# Patient Record
Sex: MALE | Race: WHITE | NOT HISPANIC OR LATINO
[De-identification: names, ages, dates, MRNs, and addresses within clinical notes are randomized per-mention and may not be internally consistent; named-entity substitution may affect disease eponyms.]

---

## 2018-01-04 ENCOUNTER — APPOINTMENT (OUTPATIENT)
Dept: CARDIOLOGY | Facility: CLINIC | Age: 83
End: 2018-01-04

## 2018-01-09 ENCOUNTER — APPOINTMENT (OUTPATIENT)
Dept: CARDIOLOGY | Facility: CLINIC | Age: 83
End: 2018-01-09
Payer: MEDICARE

## 2018-01-09 PROCEDURE — 93306 TTE W/DOPPLER COMPLETE: CPT

## 2018-05-07 ENCOUNTER — RECORD ABSTRACTING (OUTPATIENT)
Age: 83
End: 2018-05-07

## 2018-05-07 DIAGNOSIS — E03.9 HYPOTHYROIDISM, UNSPECIFIED: ICD-10-CM

## 2018-05-07 DIAGNOSIS — K21.9 GASTRO-ESOPHAGEAL REFLUX DISEASE W/OUT ESOPHAGITIS: ICD-10-CM

## 2018-05-10 ENCOUNTER — APPOINTMENT (OUTPATIENT)
Dept: CARDIOLOGY | Facility: CLINIC | Age: 83
End: 2018-05-10
Payer: MEDICARE

## 2018-05-10 VITALS
DIASTOLIC BLOOD PRESSURE: 80 MMHG | RESPIRATION RATE: 18 BRPM | BODY MASS INDEX: 29.82 KG/M2 | HEIGHT: 73 IN | WEIGHT: 225 LBS | HEART RATE: 67 BPM | SYSTOLIC BLOOD PRESSURE: 175 MMHG

## 2018-05-10 PROCEDURE — 93000 ELECTROCARDIOGRAM COMPLETE: CPT

## 2018-05-10 PROCEDURE — 99214 OFFICE O/P EST MOD 30 MIN: CPT

## 2018-05-10 RX ORDER — VERAPAMIL HYDROCHLORIDE 240 MG/1
240 CAPSULE, DELAYED RELEASE PELLETS ORAL
Qty: 90 | Refills: 0 | Status: DISCONTINUED | COMMUNITY
Start: 2018-03-08

## 2018-05-10 RX ORDER — VERAPAMIL HYDROCHLORIDE 240 MG/1
240 TABLET ORAL DAILY
Refills: 0 | Status: DISCONTINUED | COMMUNITY
End: 2018-05-10

## 2018-05-10 RX ORDER — LEVOTHYROXINE SODIUM 0.05 MG/1
50 TABLET ORAL
Qty: 105 | Refills: 0 | Status: DISCONTINUED | COMMUNITY
Start: 2018-04-19

## 2018-05-11 RX ORDER — PRAMOXINE HYDROCHLORIDE AND HYDROCORTISONE ACETATE 10; 25 MG/G; MG/G
2.5-1 CREAM TOPICAL
Qty: 48 | Refills: 0 | Status: DISCONTINUED | COMMUNITY
Start: 2017-11-11 | End: 2018-05-11

## 2018-05-11 RX ORDER — RANITIDINE 150 MG/1
150 TABLET ORAL TWICE DAILY
Refills: 0 | Status: DISCONTINUED | COMMUNITY
End: 2018-05-11

## 2018-08-12 ENCOUNTER — MOBILE ON CALL (OUTPATIENT)
Age: 83
End: 2018-08-12

## 2018-09-17 ENCOUNTER — INPATIENT (INPATIENT)
Facility: HOSPITAL | Age: 83
LOS: 2 days | Discharge: LEFT BEFORE DISCHARGE | DRG: 291 | End: 2018-09-20
Attending: INTERNAL MEDICINE | Admitting: HOSPITALIST
Payer: MEDICARE

## 2018-09-17 VITALS
OXYGEN SATURATION: 97 % | HEIGHT: 73 IN | DIASTOLIC BLOOD PRESSURE: 84 MMHG | RESPIRATION RATE: 16 BRPM | WEIGHT: 225.09 LBS | HEART RATE: 75 BPM | TEMPERATURE: 98 F | SYSTOLIC BLOOD PRESSURE: 176 MMHG

## 2018-09-17 LAB
ALBUMIN SERPL ELPH-MCNC: 2.3 G/DL — LOW (ref 3.3–5)
ALP SERPL-CCNC: 20 U/L — LOW (ref 40–120)
ALT FLD-CCNC: 20 U/L — SIGNIFICANT CHANGE UP (ref 12–78)
ANION GAP SERPL CALC-SCNC: 5 MMOL/L — SIGNIFICANT CHANGE UP (ref 5–17)
APTT BLD: 26.3 SEC — LOW (ref 27.5–37.4)
AST SERPL-CCNC: 25 U/L — SIGNIFICANT CHANGE UP (ref 15–37)
BASOPHILS # BLD AUTO: 0.08 K/UL — SIGNIFICANT CHANGE UP (ref 0–0.2)
BASOPHILS NFR BLD AUTO: 0.6 % — SIGNIFICANT CHANGE UP (ref 0–2)
BILIRUB SERPL-MCNC: 0.3 MG/DL — SIGNIFICANT CHANGE UP (ref 0.2–1.2)
BUN SERPL-MCNC: 40 MG/DL — HIGH (ref 7–23)
CALCIUM SERPL-MCNC: 8.6 MG/DL — SIGNIFICANT CHANGE UP (ref 8.5–10.1)
CHLORIDE SERPL-SCNC: 103 MMOL/L — SIGNIFICANT CHANGE UP (ref 96–108)
CK MB BLD-MCNC: 6.7 % — HIGH (ref 0–3.5)
CK MB CFR SERPL CALC: 3 NG/ML — SIGNIFICANT CHANGE UP (ref 0–3.6)
CK SERPL-CCNC: 45 U/L — SIGNIFICANT CHANGE UP (ref 26–308)
CO2 SERPL-SCNC: 26 MMOL/L — SIGNIFICANT CHANGE UP (ref 22–31)
CREAT SERPL-MCNC: 1.9 MG/DL — HIGH (ref 0.5–1.3)
EOSINOPHIL # BLD AUTO: 0.19 K/UL — SIGNIFICANT CHANGE UP (ref 0–0.5)
EOSINOPHIL NFR BLD AUTO: 1.5 % — SIGNIFICANT CHANGE UP (ref 0–6)
GLUCOSE SERPL-MCNC: 139 MG/DL — HIGH (ref 70–99)
HCT VFR BLD CALC: 25.1 % — LOW (ref 39–50)
HGB BLD-MCNC: 7.9 G/DL — LOW (ref 13–17)
IMM GRANULOCYTES NFR BLD AUTO: 0.8 % — SIGNIFICANT CHANGE UP (ref 0–1.5)
INR BLD: 1.25 RATIO — HIGH (ref 0.88–1.16)
LYMPHOCYTES # BLD AUTO: 1.04 K/UL — SIGNIFICANT CHANGE UP (ref 1–3.3)
LYMPHOCYTES # BLD AUTO: 8.3 % — LOW (ref 13–44)
MCHC RBC-ENTMCNC: 28.2 PG — SIGNIFICANT CHANGE UP (ref 27–34)
MCHC RBC-ENTMCNC: 31.5 GM/DL — LOW (ref 32–36)
MCV RBC AUTO: 89.6 FL — SIGNIFICANT CHANGE UP (ref 80–100)
MONOCYTES # BLD AUTO: 0.72 K/UL — SIGNIFICANT CHANGE UP (ref 0–0.9)
MONOCYTES NFR BLD AUTO: 5.8 % — SIGNIFICANT CHANGE UP (ref 2–14)
NEUTROPHILS # BLD AUTO: 10.37 K/UL — HIGH (ref 1.8–7.4)
NEUTROPHILS NFR BLD AUTO: 83 % — HIGH (ref 43–77)
NRBC # BLD: 0 /100 WBCS — SIGNIFICANT CHANGE UP (ref 0–0)
NT-PROBNP SERPL-SCNC: HIGH PG/ML (ref 0–450)
PLATELET # BLD AUTO: 359 K/UL — SIGNIFICANT CHANGE UP (ref 150–400)
POTASSIUM SERPL-MCNC: 4.9 MMOL/L — SIGNIFICANT CHANGE UP (ref 3.5–5.3)
POTASSIUM SERPL-SCNC: 4.9 MMOL/L — SIGNIFICANT CHANGE UP (ref 3.5–5.3)
PROT SERPL-MCNC: 7.3 G/DL — SIGNIFICANT CHANGE UP (ref 6–8.3)
PROTHROM AB SERPL-ACNC: 13.7 SEC — HIGH (ref 9.8–12.7)
RBC # BLD: 2.8 M/UL — LOW (ref 4.2–5.8)
RBC # FLD: 16.6 % — HIGH (ref 10.3–14.5)
SODIUM SERPL-SCNC: 134 MMOL/L — LOW (ref 135–145)
TROPONIN I SERPL-MCNC: 0.05 NG/ML — HIGH (ref 0.01–0.04)
WBC # BLD: 12.5 K/UL — HIGH (ref 3.8–10.5)
WBC # FLD AUTO: 12.5 K/UL — HIGH (ref 3.8–10.5)

## 2018-09-17 PROCEDURE — 71045 X-RAY EXAM CHEST 1 VIEW: CPT | Mod: 26

## 2018-09-17 PROCEDURE — 99285 EMERGENCY DEPT VISIT HI MDM: CPT

## 2018-09-17 PROCEDURE — 93010 ELECTROCARDIOGRAM REPORT: CPT

## 2018-09-17 RX ORDER — SODIUM CHLORIDE 9 MG/ML
3 INJECTION INTRAMUSCULAR; INTRAVENOUS; SUBCUTANEOUS ONCE
Qty: 0 | Refills: 0 | Status: COMPLETED | OUTPATIENT
Start: 2018-09-17 | End: 2018-09-17

## 2018-09-17 RX ORDER — IPRATROPIUM/ALBUTEROL SULFATE 18-103MCG
3 AEROSOL WITH ADAPTER (GRAM) INHALATION ONCE
Qty: 0 | Refills: 0 | Status: COMPLETED | OUTPATIENT
Start: 2018-09-17 | End: 2018-09-17

## 2018-09-17 RX ORDER — FUROSEMIDE 40 MG
40 TABLET ORAL ONCE
Qty: 0 | Refills: 0 | Status: COMPLETED | OUTPATIENT
Start: 2018-09-17 | End: 2018-09-17

## 2018-09-17 RX ADMIN — SODIUM CHLORIDE 3 MILLILITER(S): 9 INJECTION INTRAMUSCULAR; INTRAVENOUS; SUBCUTANEOUS at 22:43

## 2018-09-17 RX ADMIN — Medication 40 MILLIGRAM(S): at 23:53

## 2018-09-17 RX ADMIN — Medication 3 MILLILITER(S): at 22:49

## 2018-09-17 RX ADMIN — Medication 125 MILLIGRAM(S): at 22:49

## 2018-09-17 RX ADMIN — Medication 3 MILLILITER(S): at 23:07

## 2018-09-17 NOTE — ED ADULT NURSE NOTE - OBJECTIVE STATEMENT
Pt comes from EMS from rehab facility. Pt reports shortness of breath for the past week, worsening today. Pt has audible wheezing present. Pt placed on oxygen, given 40 of lasix IM and and 2 nitros at facility per EMS. Pt able to speak in full sentences. Pt at rehab facility for wound to left leg.

## 2018-09-17 NOTE — ED ADULT TRIAGE NOTE - CHIEF COMPLAINT QUOTE
Pt brought from [facility] for sob, LE edema, CHF exacerbation, is currently on 5L O2 via NC to bring O2 to 97%, pt O2 sat ra was 92%

## 2018-09-17 NOTE — ED ADULT NURSE NOTE - CHIEF COMPLAINT QUOTE
Pt brought from [facility] for sob, LE edema, CHF exacerbation, is currently on 5L O2 via NC to bring O2 to 97%, pt O2 sat ra was 92%

## 2018-09-17 NOTE — ED ADULT NURSE NOTE - NSIMPLEMENTINTERV_GEN_ALL_ED
Implemented All Fall with Harm Risk Interventions:  [place] to call system. Call bell, personal items and telephone within reach. Instruct patient to call for assistance. Room bathroom lighting operational. Non-slip footwear when patient is off stretcher. Physically safe environment: no spills, clutter or unnecessary equipment. Stretcher in lowest position, wheels locked, appropriate side rails in place. Provide visual cue, wrist band, yellow gown, etc. Monitor gait and stability. Monitor for mental status changes and reorient to person, place, and time. Review medications for side effects contributing to fall risk. Reinforce activity limits and safety measures with patient and family. Provide visual clues: red socks.

## 2018-09-17 NOTE — ED PROVIDER NOTE - OBJECTIVE STATEMENT
pt bib ems from [facility] for sob/byrne, le edema. pt reports sob worsening past week, relates le edema is unchanged form baseline. pt's family reports had neg LE venous doppler today. no fevers, chills, cough, d/n/v, cp, abd pain. per report, pt given lasix 40mg and albuterol 1 neb pta.  pmd - james

## 2018-09-18 DIAGNOSIS — D64.9 ANEMIA, UNSPECIFIED: ICD-10-CM

## 2018-09-18 DIAGNOSIS — Z29.9 ENCOUNTER FOR PROPHYLACTIC MEASURES, UNSPECIFIED: ICD-10-CM

## 2018-09-18 DIAGNOSIS — I10 ESSENTIAL (PRIMARY) HYPERTENSION: ICD-10-CM

## 2018-09-18 DIAGNOSIS — N40.0 BENIGN PROSTATIC HYPERPLASIA WITHOUT LOWER URINARY TRACT SYMPTOMS: ICD-10-CM

## 2018-09-18 DIAGNOSIS — R54 AGE-RELATED PHYSICAL DEBILITY: ICD-10-CM

## 2018-09-18 DIAGNOSIS — J98.4 OTHER DISORDERS OF LUNG: ICD-10-CM

## 2018-09-18 DIAGNOSIS — M10.9 GOUT, UNSPECIFIED: ICD-10-CM

## 2018-09-18 DIAGNOSIS — I50.9 HEART FAILURE, UNSPECIFIED: ICD-10-CM

## 2018-09-18 DIAGNOSIS — R06.02 SHORTNESS OF BREATH: ICD-10-CM

## 2018-09-18 DIAGNOSIS — I51.9 HEART DISEASE, UNSPECIFIED: ICD-10-CM

## 2018-09-18 DIAGNOSIS — G62.9 POLYNEUROPATHY, UNSPECIFIED: ICD-10-CM

## 2018-09-18 DIAGNOSIS — N18.4 CHRONIC KIDNEY DISEASE, STAGE 4 (SEVERE): ICD-10-CM

## 2018-09-18 DIAGNOSIS — E03.9 HYPOTHYROIDISM, UNSPECIFIED: ICD-10-CM

## 2018-09-18 DIAGNOSIS — Z98.890 OTHER SPECIFIED POSTPROCEDURAL STATES: Chronic | ICD-10-CM

## 2018-09-18 DIAGNOSIS — J61 PNEUMOCONIOSIS DUE TO ASBESTOS AND OTHER MINERAL FIBERS: ICD-10-CM

## 2018-09-18 DIAGNOSIS — R06.00 DYSPNEA, UNSPECIFIED: ICD-10-CM

## 2018-09-18 DIAGNOSIS — N18.9 CHRONIC KIDNEY DISEASE, UNSPECIFIED: ICD-10-CM

## 2018-09-18 DIAGNOSIS — D72.829 ELEVATED WHITE BLOOD CELL COUNT, UNSPECIFIED: ICD-10-CM

## 2018-09-18 LAB
CK MB BLD-MCNC: 6.1 % — HIGH (ref 0–3.5)
CK MB BLD-MCNC: 7.4 % — HIGH (ref 0–3.5)
CK MB CFR SERPL CALC: 2.5 NG/ML — SIGNIFICANT CHANGE UP (ref 0–3.6)
CK MB CFR SERPL CALC: 2.7 NG/ML — SIGNIFICANT CHANGE UP (ref 0–3.6)
CK SERPL-CCNC: 34 U/L — SIGNIFICANT CHANGE UP (ref 26–308)
CK SERPL-CCNC: 44 U/L — SIGNIFICANT CHANGE UP (ref 26–308)
CRP SERPL-MCNC: 7.37 MG/DL — HIGH (ref 0–0.4)
HCT VFR BLD CALC: 29.4 % — LOW (ref 39–50)
HGB BLD-MCNC: 9.3 G/DL — LOW (ref 13–17)
MCHC RBC-ENTMCNC: 28.3 PG — SIGNIFICANT CHANGE UP (ref 27–34)
MCHC RBC-ENTMCNC: 31.6 GM/DL — LOW (ref 32–36)
MCV RBC AUTO: 89.4 FL — SIGNIFICANT CHANGE UP (ref 80–100)
NRBC # BLD: 0 /100 WBCS — SIGNIFICANT CHANGE UP (ref 0–0)
PLATELET # BLD AUTO: 393 K/UL — SIGNIFICANT CHANGE UP (ref 150–400)
RBC # BLD: 3.29 M/UL — LOW (ref 4.2–5.8)
RBC # FLD: 16.6 % — HIGH (ref 10.3–14.5)
TROPONIN I SERPL-MCNC: 0.04 NG/ML — SIGNIFICANT CHANGE UP (ref 0.01–0.04)
TROPONIN I SERPL-MCNC: 0.05 NG/ML — HIGH (ref 0.01–0.04)
WBC # BLD: 10.6 K/UL — HIGH (ref 3.8–10.5)
WBC # FLD AUTO: 10.6 K/UL — HIGH (ref 3.8–10.5)

## 2018-09-18 PROCEDURE — 93306 TTE W/DOPPLER COMPLETE: CPT | Mod: 26

## 2018-09-18 PROCEDURE — 12345: CPT | Mod: NC,GC

## 2018-09-18 PROCEDURE — 71250 CT THORAX DX C-: CPT | Mod: 26

## 2018-09-18 PROCEDURE — 99223 1ST HOSP IP/OBS HIGH 75: CPT | Mod: AI,GC

## 2018-09-18 PROCEDURE — 99497 ADVNCD CARE PLAN 30 MIN: CPT | Mod: 25

## 2018-09-18 PROCEDURE — 93970 EXTREMITY STUDY: CPT | Mod: 26

## 2018-09-18 PROCEDURE — 99223 1ST HOSP IP/OBS HIGH 75: CPT

## 2018-09-18 RX ORDER — FINASTERIDE 5 MG/1
5 TABLET, FILM COATED ORAL DAILY
Qty: 0 | Refills: 0 | Status: DISCONTINUED | OUTPATIENT
Start: 2018-09-18 | End: 2018-09-20

## 2018-09-18 RX ORDER — PANTOPRAZOLE SODIUM 20 MG/1
40 TABLET, DELAYED RELEASE ORAL
Qty: 0 | Refills: 0 | Status: DISCONTINUED | OUTPATIENT
Start: 2018-09-18 | End: 2018-09-20

## 2018-09-18 RX ORDER — ALBUTEROL 90 UG/1
2.5 AEROSOL, METERED ORAL EVERY 8 HOURS
Qty: 0 | Refills: 0 | Status: DISCONTINUED | OUTPATIENT
Start: 2018-09-18 | End: 2018-09-20

## 2018-09-18 RX ORDER — MAGNESIUM HYDROXIDE 400 MG/1
15 TABLET, CHEWABLE ORAL AT BEDTIME
Qty: 0 | Refills: 0 | Status: DISCONTINUED | OUTPATIENT
Start: 2018-09-18 | End: 2018-09-20

## 2018-09-18 RX ORDER — FUROSEMIDE 40 MG
40 TABLET ORAL DAILY
Qty: 0 | Refills: 0 | Status: DISCONTINUED | OUTPATIENT
Start: 2018-09-18 | End: 2018-09-18

## 2018-09-18 RX ORDER — ASCORBIC ACID 60 MG
500 TABLET,CHEWABLE ORAL DAILY
Qty: 0 | Refills: 0 | Status: DISCONTINUED | OUTPATIENT
Start: 2018-09-18 | End: 2018-09-20

## 2018-09-18 RX ORDER — CHOLECALCIFEROL (VITAMIN D3) 125 MCG
1000 CAPSULE ORAL DAILY
Qty: 0 | Refills: 0 | Status: DISCONTINUED | OUTPATIENT
Start: 2018-09-18 | End: 2018-09-20

## 2018-09-18 RX ORDER — CALCIUM CARBONATE 500(1250)
2 TABLET ORAL DAILY
Qty: 0 | Refills: 0 | Status: DISCONTINUED | OUTPATIENT
Start: 2018-09-18 | End: 2018-09-20

## 2018-09-18 RX ORDER — CALCIUM CARBONATE 500(1250)
1 TABLET ORAL DAILY
Qty: 0 | Refills: 0 | Status: DISCONTINUED | OUTPATIENT
Start: 2018-09-18 | End: 2018-09-18

## 2018-09-18 RX ORDER — CARVEDILOL PHOSPHATE 80 MG/1
25 CAPSULE, EXTENDED RELEASE ORAL EVERY 12 HOURS
Qty: 0 | Refills: 0 | Status: DISCONTINUED | OUTPATIENT
Start: 2018-09-18 | End: 2018-09-20

## 2018-09-18 RX ORDER — LEVOTHYROXINE SODIUM 125 MCG
50 TABLET ORAL DAILY
Qty: 0 | Refills: 0 | Status: DISCONTINUED | OUTPATIENT
Start: 2018-09-18 | End: 2018-09-20

## 2018-09-18 RX ORDER — ENOXAPARIN SODIUM 100 MG/ML
40 INJECTION SUBCUTANEOUS EVERY 24 HOURS
Qty: 0 | Refills: 0 | Status: DISCONTINUED | OUTPATIENT
Start: 2018-09-18 | End: 2018-09-20

## 2018-09-18 RX ORDER — TAMSULOSIN HYDROCHLORIDE 0.4 MG/1
0.4 CAPSULE ORAL AT BEDTIME
Qty: 0 | Refills: 0 | Status: DISCONTINUED | OUTPATIENT
Start: 2018-09-18 | End: 2018-09-20

## 2018-09-18 RX ORDER — GABAPENTIN 400 MG/1
100 CAPSULE ORAL AT BEDTIME
Qty: 0 | Refills: 0 | Status: DISCONTINUED | OUTPATIENT
Start: 2018-09-18 | End: 2018-09-20

## 2018-09-18 RX ORDER — FERROUS SULFATE 325(65) MG
325 TABLET ORAL DAILY
Qty: 0 | Refills: 0 | Status: DISCONTINUED | OUTPATIENT
Start: 2018-09-18 | End: 2018-09-20

## 2018-09-18 RX ORDER — DOCUSATE SODIUM 100 MG
100 CAPSULE ORAL DAILY
Qty: 0 | Refills: 0 | Status: DISCONTINUED | OUTPATIENT
Start: 2018-09-18 | End: 2018-09-20

## 2018-09-18 RX ORDER — INFLUENZA VIRUS VACCINE 15; 15; 15; 15 UG/.5ML; UG/.5ML; UG/.5ML; UG/.5ML
0.5 SUSPENSION INTRAMUSCULAR ONCE
Qty: 0 | Refills: 0 | Status: DISCONTINUED | OUTPATIENT
Start: 2018-09-18 | End: 2018-09-20

## 2018-09-18 RX ORDER — FUROSEMIDE 40 MG
40 TABLET ORAL
Qty: 0 | Refills: 0 | Status: DISCONTINUED | OUTPATIENT
Start: 2018-09-18 | End: 2018-09-20

## 2018-09-18 RX ADMIN — Medication 1000 UNIT(S): at 11:33

## 2018-09-18 RX ADMIN — Medication 50 MICROGRAM(S): at 04:55

## 2018-09-18 RX ADMIN — GABAPENTIN 100 MILLIGRAM(S): 400 CAPSULE ORAL at 21:41

## 2018-09-18 RX ADMIN — Medication 325 MILLIGRAM(S): at 11:33

## 2018-09-18 RX ADMIN — PANTOPRAZOLE SODIUM 40 MILLIGRAM(S): 20 TABLET, DELAYED RELEASE ORAL at 04:55

## 2018-09-18 RX ADMIN — ENOXAPARIN SODIUM 40 MILLIGRAM(S): 100 INJECTION SUBCUTANEOUS at 04:55

## 2018-09-18 RX ADMIN — CARVEDILOL PHOSPHATE 25 MILLIGRAM(S): 80 CAPSULE, EXTENDED RELEASE ORAL at 17:57

## 2018-09-18 RX ADMIN — TAMSULOSIN HYDROCHLORIDE 0.4 MILLIGRAM(S): 0.4 CAPSULE ORAL at 21:41

## 2018-09-18 RX ADMIN — FINASTERIDE 5 MILLIGRAM(S): 5 TABLET, FILM COATED ORAL at 11:32

## 2018-09-18 RX ADMIN — Medication 1 TABLET(S): at 11:32

## 2018-09-18 RX ADMIN — CARVEDILOL PHOSPHATE 25 MILLIGRAM(S): 80 CAPSULE, EXTENDED RELEASE ORAL at 04:56

## 2018-09-18 RX ADMIN — ALBUTEROL 2.5 MILLIGRAM(S): 90 AEROSOL, METERED ORAL at 16:13

## 2018-09-18 RX ADMIN — Medication 40 MILLIGRAM(S): at 17:57

## 2018-09-18 RX ADMIN — Medication 2 TABLET(S): at 13:03

## 2018-09-18 RX ADMIN — ALBUTEROL 2.5 MILLIGRAM(S): 90 AEROSOL, METERED ORAL at 23:13

## 2018-09-18 RX ADMIN — Medication 500 MILLIGRAM(S): at 11:35

## 2018-09-18 NOTE — H&P ADULT - ATTENDING COMMENTS
BNP elevated with respiratory distress, likely in the setting of new CHF  Continue IV lasix  Cardio consulted.   Trend trops  Will be on telemetry  F/U CT scan to rule out mass BNP elevated with respiratory distress, likely in the setting of new CHF  in setting of RICKY, previous renal function unknown, hold off on daily IV lasix, repeat labs AM.  if improved, then continue IV lasix. Strict I and Os.   Cardio consulted.   Trend trops  Will be on telemetry  F/U CT scan to rule out mass BNP elevated with respiratory distress, likely in the setting of new CHF  in setting of RICKY, previous renal function unknown, hold off on daily IV lasix, repeat labs AM.  if improved, then continue IV lasix. Strict I and Os.   Cardio consulted.   Trend trops  Will be on telemetry  repeat H and H to determine if stable and chronic.    F/U CT scan to rule out mass

## 2018-09-18 NOTE — DISCHARGE NOTE ADULT - ADDITIONAL INSTRUCTIONS
-Please follow up with your primary doctor within one week.  -Please follow up with outpatient (information below) within one week of discharge.  -Patient and family to set up follow up appointments.  -Continue taking your medications as directed above.  -If symptoms persist/worsen, please call your PMD or return to the ED.

## 2018-09-18 NOTE — H&P ADULT - PROBLEM SELECTOR PLAN 9
continue Lovenox 40mg subq daily   IMPROVE VTE Individual Risk Assessment          RISK                                                          Points  [  ] Previous VTE                                                3  [  ] Thrombophilia                                             2  [  ] Lower limb paralysis                                   2        (unable to hold up >15 seconds)    [  ] Current Cancer                                             2         (within 6 months)  [ x ] Immobilization > 24 hrs                              1  [  ] ICU/CCU stay > 24 hours                             1  [ x ] Age > 60                                                         1    IMPROVE VTE Score: 2 pt has hx of anemia, likely chronic.  Will repeat labs AM to determine if its stable and chronic

## 2018-09-18 NOTE — CONSULT NOTE ADULT - PROBLEM SELECTOR RECOMMENDATION 9
heart disease  HF  valv heart disease  AS and AI  reviewed [facility] records  cont diuresis  cardio eval in progress  daily weight  dietary discretion  prognosis guarded

## 2018-09-18 NOTE — H&P ADULT - HISTORY OF PRESENT ILLNESS
89 yo M with PMHx of HTN, Neuropathy, Hypothyroid, anemia, Gout, s/p calcaneal implanted and recent removal of infected hardware at [place] presented to ED BIB EMS from [facility] for difficulty breathing, dyspnea on exertion and left LE swellings. As per daughter at bedside, pt has been c/o cough with associated chills for few day and she was at  this afternoon visit him and pt appeared fatigued and not himself today. She states she left  around 5pm and when she called back 8pm pt c/o difficulty breathing and was unable to talk to her over the phone, when she arrived at  pt was in severe respiratory distress. Pt was given Lasix and albuterol w/o relieve thus she brought him to ED for further workup. Pt was hospitalized at [place] for infected left calcaneal hardware that was removed, pt was sent home with PICC line and wound vac then sent to  for rehab. Pt has been doing well since and wound has completely healed. She endorses to increasing Left LE swelling and edema for which venous doppler LE done at  neg for DVT. Pt states his sob has improved after Lasix in the ED, denies cp, palpitations, dizziness, recent change in his die, fever, chills. Pt is baseline anemic but recent CBC showed heg 7.3 and FOBT was done which was negative, pt received Procrit x 1 dose at . No document know hx of CHF as per daughter, Pt follow up with Dr. Anton cardiologist as outpatient.     In the ED, vitals wnl, labs pertinent for wbc 12.5, H/H 7.9/25.1, BUN/Cr 40/1.9, Na 134, troponin .047, ProBNP 05619, pt received Lasix 40mg IV x 1 dose. 89 yo M with PMHx of HTN, Neuropathy, Hypothyroid, anemia, Gout, s/p calcaneal implanted and recent removal of infected hardware at [place] presented to ED BIB EMS from [facility] for difficulty breathing, dyspnea on exertion and left LE swellings. As per daughter at bedside, pt has been c/o cough with associated chills for few day and she was at  this afternoon visit him and pt appeared fatigued and not himself today. She states she left  around 5pm and when she called back 8pm pt c/o difficulty breathing and was unable to talk to her over the phone, when she arrived at  pt was in severe respiratory distress. Pt was given Lasix and albuterol w/o relieve thus she brought him to ED for further workup. Pt was hospitalized at [place] for infected left calcaneal hardware that was removed, pt was sent home with PICC line and wound vac then sent to  for rehab. Pt has been doing well since and wound has completely healed. She endorses to increasing Left LE swelling and edema for which venous doppler LE done at  neg for DVT. Pt states his sob has improved after Lasix in the ED, denies cp, palpitations, dizziness, recent change in his diet, fever, chills. Pt is baseline anemic but recent CBC showed heg 7.3 and FOBT was done which was negative, pt received Procrit x 1 dose at . No document know hx of CHF as per daughter, Pt follow up with Dr. Anton cardiologist as outpatient.     In the ED, vitals wnl, labs pertinent for wbc 12.5, H/H 7.9/25.1, BUN/Cr 40/1.9, Na 134, troponin .047, ProBNP 33940, pt received Lasix 40mg IV x 1 dose.     No family history of CHF

## 2018-09-18 NOTE — GOALS OF CARE CONVERSATION - PERSONAL ADVANCE DIRECTIVE - CONVERSATION DETAILS
met pt w daughterDolores, unsure where hcp located, hcp form completed by pt, daughter Dolores is hcp, alt hcp son, Magen. pt has had discussions of his resuscitation wishes. pt wants full cpr at this time, pt does not want to live on tubes, would not want a feeding tube. contact # given.

## 2018-09-18 NOTE — PROGRESS NOTE ADULT - ASSESSMENT
89 yo M with PMHx of HTN, Neuropathy, Hypothyroid, anemia, Gout, s/p calcaneal implanted and recent removal of infected hardware at [place] presented to ED BIB EMS from [facility] for difficulty breathing, dyspnea on exertion and left LE swellings admitted to telemetry for acute CHF exacerbation.

## 2018-09-18 NOTE — DISCHARGE NOTE ADULT - CARE PLAN
Principal Discharge DX:	CHF (congestive heart failure)  Goal:	Stable  Assessment and plan of treatment:	You were admitted for difficulty breathing, on admission your ProBNP was elevated which is a marker that tells us about the pumping ability of your heart. An echo was done and it showed _________________.  This could have been causing your difficulty breathing as blood backs up when you have an acute exacerbation of congestive heart failure since the heart is not able to pump out blood properly.  You were treated with lasix and coreg.  Please follow up with your cardiologist and PMD within one week after discharge.  Secondary Diagnosis:	Chronic lung disease  Assessment and plan of treatment:	Because of your difficulty breathing, a chest xray was done which showed some calcifications present on the surround area of your lungs.  A CT chest was performed which showed these calcifications may be due to asbestos.  There is no treatment for this, and is mostly supportive.  Please continue to take your medications and follow up with your PMD.  Secondary Diagnosis:	CKD (chronic kidney disease)  Assessment and plan of treatment:	Your kidney function was low at the time of your admission and you were treated with lasix to help remove extra fluid from your body.  This could have been due to the fact that your heart wasn't pumping blood properly and thus preventing your kidneys from receiving the proper amount of blood.  Please continue to take lasix to control the fluid in your body and prevent too much fluid from building up. Please see your PMD within one week after discharge. Principal Discharge DX:	CHF (congestive heart failure)  Goal:	Stable  Assessment and plan of treatment:	You were admitted for difficulty breathing, on admission your ProBNP was elevated which is a marker that tells us about the pumping ability of your heart. An echo was done and it showed that the filling ability of the heart was decreased.  This could have been causing your difficulty breathing as blood backs up when you have an acute exacerbation of congestive heart failure since the heart is not able to pump out blood properly.  You were treated with lasix and coreg.  Please follow up with your cardiologist and PMD within one week after discharge.  Secondary Diagnosis:	Chronic lung disease  Assessment and plan of treatment:	Because of your difficulty breathing, a chest xray was done which showed some calcifications present on the surround area of your lungs.  A CT chest was performed which showed these calcifications may be due to asbestos.  There is no treatment for this, and is mostly supportive.  Please continue to take your medications and follow up with your PMD.  Secondary Diagnosis:	CKD (chronic kidney disease)  Assessment and plan of treatment:	Your kidney function was low at the time of your admission and you were treated with lasix to help remove extra fluid from your body.  This could have been due to the fact that your heart wasn't pumping blood properly and thus preventing your kidneys from receiving the proper amount of blood.  Please continue to take lasix to control the fluid in your body and prevent too much fluid from building up. Please see your PMD within one week after discharge.

## 2018-09-18 NOTE — PROGRESS NOTE ADULT - PROBLEM SELECTOR PLAN 2
- Likely reactive, pt not septic   - WBC: 12.5--> 10.6    CT w/o cont to r/o pneumonia Asbestosis, pleural plaques.  ct chest revealed chronic lung changes.  likely obstructive and restr lung disese -Asbestosis, pleural plaques.  -ct chest revealed chronic lung changes.  -likely obstructive and restr lung disese  -pulm (Deepthi) recs appreciated

## 2018-09-18 NOTE — PROGRESS NOTE ADULT - SUBJECTIVE AND OBJECTIVE BOX
CHIEF COMPLAINT/INTERVAL HISTORY:    89 yo M with PMHx of HTN, Neuropathy, Hypothyroid, anemia, Gout, s/p calcaneal implanted and recent removal of infected hardware at [place] presented to ED BIB EMS from [facility] for difficulty breathing, dyspnea on exertion and left LE swellings admitted to telemetry for acute CHF exacerbation.     Interval events: no overnight events, morning telemetry: normal sinus rhythm 80    Pt examined at bedside this morning.  Pt reports improvement in dyspnea after receiving furosemide in ED.  Pt denies previous admission for similar symptoms.  Pt denies chest pain/palpitations, pain in extremities.  Pt also reports improvement in LE swelling.    REVIEW OF SYSTEMS:  CONSTITUTIONAL: No fever or chills  EYES: No eye pain or visual disturbances  RESPIRATORY: +SOB but improved  CARDIOVASCULAR: No chest pain or palpitations  GASTROINTESTINAL: No abdominal or epigastric pain, nausea, vomiting, diarrhea or constipation  GENITOURINARY: No dysuria, frequency, urgency, hematuria, or incontinence  NEUROLOGICAL:  No headaches, No dizziness, No weakness   MUSCULOSKELETAL: No joint pain,  + lower extremity swelling lt worse than rt      Vital Signs Last 24 Hrs  T(C): 36.4 (18 Sep 2018 07:52), Max: 36.8 (17 Sep 2018 22:09)  T(F): 97.6 (18 Sep 2018 07:52), Max: 98.2 (17 Sep 2018 22:09)  HR: 72 (18 Sep 2018 07:52) (69 - 78)  BP: 175/85 (18 Sep 2018 07:52) (163/82 - 190/98)  BP(mean): 96 (17 Sep 2018 23:51) (96 - 106)  RR: 16 (18 Sep 2018 07:52) (16 - 20)  SpO2: 97% (18 Sep 2018 07:52) (93% - 99%)    PHYSICAL EXAM:  GENERAL: NAD, pleasant  HEENT: EOMI, hearing normal, conjunctiva and sclera clear, nasal cannula in place  Chest: inspiratory crackles appreciated diffusely b/l, no wheezing  CV: +S1S2, RRR, systolic murmur appreciated most prominently in aortic area  GI: soft, +BS, NT/ND  Musculoskeletal: +2 pitting edema in b/l LE, nontender, nonerythematous, lt leg larger in size than rt, lt foot wrapped  Psychiatric: affect nL, mood nL  Skin: warm and dry    LABS:                        9.3    10.60 )-----------( 393      ( 18 Sep 2018 04:42 )             29.4     09-18    136  |  103  |  40<H>  ----------------------------<  146<H>  5.2   |  22  |  2.00<H>    Ca    8.8      18 Sep 2018 04:09    TPro  7.3  /  Alb  2.3<L>  /  TBili  0.3  /  DBili  x   /  AST  25  /  ALT  20  /  AlkPhos  20<L>  09-17    PT/INR - ( 17 Sep 2018 22:45 )   PT: 13.7 sec;   INR: 1.25 ratio      PTT - ( 17 Sep 2018 22:45 )  PTT:26.3 sec CHIEF COMPLAINT/INTERVAL HISTORY:    89 yo M with PMHx of HTN, Neuropathy, Hypothyroid, anemia, Gout, s/p calcaneal implanted and recent removal of infected hardware at [place] presented to ED BIB EMS from [facility] for difficulty breathing, dyspnea on exertion and left LE swellings admitted to telemetry for acute CHF exacerbation.     Interval events: no overnight events, morning telemetry: normal sinus rhythm 80    Pt examined at bedside this morning.  Pt reports improvement in dyspnea after receiving furosemide in ED.  Pt denies previous admission for similar symptoms.  Pt denies chest pain/palpitations, pain in extremities.  Pt also reports improvement in LE swelling.    REVIEW OF SYSTEMS:  CONSTITUTIONAL: No fever or chills  EYES: No eye pain or visual disturbances  RESPIRATORY: +SOB but improved  CARDIOVASCULAR: No chest pain or palpitations  GASTROINTESTINAL: No abdominal or epigastric pain, nausea, vomiting, diarrhea or constipation  GENITOURINARY: No dysuria, frequency, urgency, hematuria, or incontinence  NEUROLOGICAL:  No headaches, No dizziness, No weakness   MUSCULOSKELETAL: No joint pain,  + lower extremity swelling lt worse than rt    Vital Signs Last 24 Hrs  T(C): 36.4 (18 Sep 2018 07:52), Max: 36.8 (17 Sep 2018 22:09)  T(F): 97.6 (18 Sep 2018 07:52), Max: 98.2 (17 Sep 2018 22:09)  HR: 72 (18 Sep 2018 07:52) (69 - 78)  BP: 175/85 (18 Sep 2018 07:52) (163/82 - 190/98)  BP(mean): 96 (17 Sep 2018 23:51) (96 - 106)  RR: 16 (18 Sep 2018 07:52) (16 - 20)  SpO2: 97% (18 Sep 2018 07:52) (93% - 99%)    PHYSICAL EXAM:  GENERAL: NAD, pleasant  HEENT: EOMI, hearing normal, conjunctiva and sclera clear, nasal cannula in place  Chest: inspiratory crackles appreciated diffusely b/l, no wheezing  CV: +S1S2, RRR, systolic murmur appreciated most prominently in aortic area  GI: soft, +BS, NT/ND  Musculoskeletal: +2 pitting edema in b/l LE, nontender, nonerythematous, lt leg larger in size than rt, lt foot wrapped  Psychiatric: affect nL, mood nL  Skin: warm and dry    LABS:                        9.3    10.60 )-----------( 393      ( 18 Sep 2018 04:42 )             29.4     09-18    136  |  103  |  40<H>  ----------------------------<  146<H>  5.2   |  22  |  2.00<H>    Ca    8.8      18 Sep 2018 04:09    TPro  7.3  /  Alb  2.3<L>  /  TBili  0.3  /  DBili  x   /  AST  25  /  ALT  20  /  AlkPhos  20<L>  09-17    PT/INR - ( 17 Sep 2018 22:45 )   PT: 13.7 sec;   INR: 1.25 ratio      PTT - ( 17 Sep 2018 22:45 )  PTT:26.3 sec

## 2018-09-18 NOTE — PROGRESS NOTE ADULT - PROBLEM SELECTOR PLAN 1
- Acute CHF exacerbation, unknown LV function no previous Echo available   - Elevated ProBNP 80081, systolic murmur on exam likely AS   - CXR: Increased interstitial lung markings with superimposed bilateral pleural calcifications and/or airspace opacities.   - CT chest:  Bilateral small pleural effusions with associated atelectasis.  Moderate patchy bilateral airspace disease or consolidation, worse at the right lung.  - US Venous Doppler: No evidence of bilateral lower extremity deep venous thrombosis.  - f/u Echo to evaluate LV function  - s/p Lasix 40mg IV push in the ED   - Continue Lasix IV 40mg daily, strict I/O, daily wt  - may need to obtain records from Dr. Aneesh Anton office in the am at [phone]  - troponins: 0.047; 0.05: mildly elevated likely 2/2 demand ischemia in the setting of CHF   - Cardio Dr. Ruffin's group consulted, f/u further recs - Acute CHF exacerbation, unknown LV function no previous Echo available   - Elevated ProBNP 75505, systolic murmur on exam likely AS   - CXR: Increased interstitial lung markings with superimposed bilateral pleural calcifications and/or airspace opacities.   - CT chest:  Bilateral small pleural effusions with associated atelectasis.  Moderate patchy bilateral airspace disease or consolidation, worse at the right lung.  - US Venous Doppler: No evidence of bilateral lower extremity deep venous thrombosis.  - f/u Echo to evaluate LV function  - s/p Lasix 40mg IV push in the ED   - Continue Lasix IV 40mg daily, strict I/O, daily wt  - - troponins: 0.047; 0.05: mildly elevated likely 2/2 demand ischemia in the setting of CHF   - Cardio Dr. Ruffin's group consulted, f/u further recs - Acute CHF exacerbation, unknown LV function no previous Echo available   - Elevated ProBNP 99196, systolic murmur on exam likely AS   - CXR: Increased interstitial lung markings with superimposed bilateral pleural calcifications and/or airspace opacities.   - CT chest:  Bilateral small pleural effusions with associated atelectasis.  Moderate patchy bilateral airspace disease or consolidation, worse at the right lung.  - US Venous Doppler: No evidence of bilateral lower extremity deep venous thrombosis.  - f/u Echo to evaluate LV function  - s/p Lasix 40mg IV push in the ED   - Continue Lasix IV 40mg daily and coreg 25mg BID, strict I/O, daily wt  - troponins: 0.047; 0.05: mildly elevated likely 2/2 demand ischemia in the setting of CHF, trending down (now 0.038)  - Cardio (Savella) recs appreciated

## 2018-09-18 NOTE — CONSULT NOTE ADULT - PROBLEM SELECTOR RECOMMENDATION 6
dyspnea  multifactorial  lung and heart disease  cont cvs regimen and cardiac optimization  o2 support as needed to keep sat > 88 pct  will check LE dopplers  will follow and monitor  prognosis guarded  pt has asbestosis and pleural plaques, there is no treatment for this condition and dyspnea is almost certain and guaranteed

## 2018-09-18 NOTE — H&P ADULT - PROBLEM SELECTOR PLAN 2
likely reactive, pt not septic   trend wbc  CXR prelim read showed bilateral opacity likely chf vs pneumonia   CT w/o cont to r/o pneumonia

## 2018-09-18 NOTE — H&P ADULT - PMH
Anemia, unspecified type    Benign prostatic hyperplasia, unspecified whether lower urinary tract symptoms present    Gout, unspecified cause, unspecified chronicity, unspecified site    HTN (hypertension)    Hypothyroidism, unspecified type    Neuropathy

## 2018-09-18 NOTE — DISCHARGE NOTE ADULT - HOSPITAL COURSE
89 yo M with PMHx of HTN, Neuropathy, Hypothyroid, anemia, Gout, s/p calcaneal implanted and recent removal of infected hardware at [place] presented to ED BIB EMS from [facility] for difficulty breathing, dyspnea on exertion and left LE swellings. As per daughter at bedside, pt has been c/o cough with associated chills for few day and she was at  when pt appeared fatigued and not himself and in severe respiratory distress. Pt was given Lasix and albuterol w/o relief thus brought him to ED for further workup. Pt was hospitalized at [place] for infected left calcaneal hardware that was removed, pt was sent home with PICC line and wound vac then sent to  for rehab. Pt has been doing well since and wound has completely healed. Daughter endorses to increasing Left LE swelling and edema of patient for which venous doppler LE done at  neg for DVT.  Pt is baseline anemic but recent CBC at  showed Hb 7.3 and FOBT was done which was negative, pt received Procrit x 1 dose at . No document or known hx of CHF as per daughter, Pt follow up with Dr. Anton cardiologist as outpatient. In the ED, vitals wnl, labs pertinent for wbc 12.5, H/H 7.9/25.1, BUN/Cr 40/1.9, Na 134, troponin .047, ProBNP 55711, pt received Lasix 40mg IV x 1 dose.  Patient's trops were trended, 2nd mildly elevated but 3rd trended downwards, most likely due to demand ischemia.  Cardio consulted (Erica) and patient started on home meds of lasix and coreg, echo done showed ________________.  CXR showed increased interstitial lung markings with superimposed bilateral pleural calcifications and/or airspace opacities. Follow up CT chest showed small to moderate bilateral pleural effusions, extensive bilateral pleural calcifications and calcified pleural plaques, finding which may be associated with asbestos related pleural disease. Multifocal airspace and ground-glass opacities may reflect pulmonary edema and/or infection/ pneumonia.  Pulm consulted (Deepthi) believe obstructive and restr lung disease, likely asbestos.  Patient started on incentive spirometry and albuterol.  Patient had US Venous Doppler for lower leg edema, showed no evidence of bilateral lower extremity deep venous thrombosis.  Patient also had elevated creatinine, not sure of baseline but Cr 1.9 on admission, likely prerenal azotemia. 89 yo M with PMHx of HTN, Neuropathy, Hypothyroid, anemia, Gout, s/p calcaneal implanted and recent removal of infected hardware at [place] presented to ED BIB EMS from [facility] for difficulty breathing, dyspnea on exertion and left LE swellings. As per daughter at bedside, pt has been c/o cough with associated chills for few day and she was at  when pt appeared fatigued and not himself and in severe respiratory distress. Pt was given Lasix and albuterol w/o relief thus brought him to ED for further workup. Pt was hospitalized at [place] for infected left calcaneal hardware that was removed, pt was sent home with PICC line and wound vac then sent to  for rehab. Pt has been doing well since and wound has completely healed. Daughter endorses to increasing Left LE swelling and edema of patient for which venous doppler LE done at  neg for DVT.  Pt is baseline anemic but recent CBC at  showed Hb 7.3 and FOBT was done which was negative, pt received Procrit x 1 dose at . No document or known hx of CHF as per daughter, Pt follow up with Dr. Anton cardiologist as outpatient. In the ED, vitals wnl, labs pertinent for wbc 12.5, H/H 7.9/25.1, BUN/Cr 40/1.9, Na 134, troponin .047, ProBNP 62660, EKG showed normal sinus rhythm, possible Left atrial enlargement, incomplete right bundle branch block.  Pt received Lasix 40mg IV x 1 dose.  Patient's trops were trended, 2nd mildly elevated but 3rd trended downwards, most likely due to demand ischemia.  Cardio consulted (Erica) and patient started on home meds of lasix and coreg, echo done showed ________________.  CXR showed increased interstitial lung markings with superimposed bilateral pleural calcifications and/or airspace opacities. Follow up CT chest showed small to moderate bilateral pleural effusions, extensive bilateral pleural calcifications and calcified pleural plaques, finding which may be associated with asbestos related pleural disease. Multifocal airspace and ground-glass opacities may reflect pulmonary edema and/or infection/ pneumonia.  Pulm consulted (Deepthi) believe obstructive and restr lung disease, likely asbestos.  Patient started on incentive spirometry and albuterol.  Patient had US Venous Doppler for lower leg edema, showed no evidence of bilateral lower extremity deep venous thrombosis.  Patient also had elevated creatinine, not sure of baseline but Cr 1.9 on admission, likely prerenal azotemia. 89 yo M with PMHx of HTN, Neuropathy, Hypothyroid, anemia, Gout, s/p calcaneal implanted and recent removal of infected hardware at [place] presented to ED BIB EMS from [facility] for difficulty breathing, dyspnea on exertion and left LE swellings. As per daughter at bedside, pt has been c/o cough with associated chills for few day and she was at  when pt appeared fatigued and not himself and in severe respiratory distress. Pt was given Lasix and albuterol w/o relief thus brought him to ED for further workup. Pt was hospitalized at [place] for infected left calcaneal hardware that was removed, pt was sent home with PICC line and wound vac then sent to  for rehab. Pt has been doing well since and wound has completely healed. Daughter endorses to increasing Left LE swelling and edema of patient for which venous doppler LE done at  neg for DVT.  Pt is baseline anemic but recent CBC at  showed Hb 7.3 and FOBT was done which was negative, pt received Procrit x 1 dose at . No document or known hx of CHF as per daughter, Pt follow up with Dr. Anton cardiologist as outpatient. In the ED, vitals wnl, labs pertinent for wbc 12.5, H/H 7.9/25.1, BUN/Cr 40/1.9, Na 134, troponin .047, ProBNP 74712, EKG showed normal sinus rhythm, possible Left atrial enlargement, incomplete right bundle branch block.  Pt received Lasix 40mg IV x 1 dose.  Patient's trops were trended, 2nd mildly elevated but 3rd trended downwards, most likely due to demand ischemia.  Cardio consulted (Erica) and patient started on home meds of lasix and coreg, echo done showed moderate aortic stenosis. Preserved L ventricular systolic function.  EF 60-65. Stage II diastolic dysfxn. Mod-severe pulm htn.  CXR showed increased interstitial lung markings with superimposed bilateral pleural calcifications and/or airspace opacities. Follow up CT chest showed small to moderate bilateral pleural effusions, extensive bilateral pleural calcifications and calcified pleural plaques, finding which may be associated with asbestos related pleural disease. Multifocal airspace and ground-glass opacities may reflect pulmonary edema and/or infection/ pneumonia.  Pulm consulted (Deepthi) believe obstructive and restr lung disease, likely asbestos.  Patient started on incentive spirometry and albuterol.  Patient had US Venous Doppler for lower leg edema, showed no evidence of bilateral lower extremity deep venous thrombosis.  Patient also had elevated creatinine, not sure of baseline but Cr 1.9 on admission, likely prerenal azotemia.  Patient had drop in hemoglobin to 7.5, H/H was closely monitored and type and screen ordered in case of transfusion.  Patient started on hydralazine as was having elevated SBP. 89 yo M with PMHx of HTN, Neuropathy, Hypothyroid, anemia, Gout, s/p calcaneal implanted and recent removal of infected hardware at [place] presented to ED BIB EMS from [facility] for difficulty breathing, dyspnea on exertion and left LE swellings. As per daughter at bedside, pt has been c/o cough with associated chills for few day and she was at  when pt appeared fatigued and not himself and in severe respiratory distress. Pt was given Lasix and albuterol w/o relief thus brought him to ED for further workup. Pt was hospitalized at [place] for infected left calcaneal hardware that was removed, pt was sent home with PICC line and wound vac then sent to  for rehab. Pt has been doing well since and wound has completely healed. Daughter endorses to increasing Left LE swelling and edema of patient for which venous doppler LE done at  neg for DVT.  Pt is baseline anemic but recent CBC at  showed Hb 7.3 and FOBT was done which was negative, pt received Procrit x 1 dose at . No document or known hx of CHF as per daughter, Pt follow up with Dr. Anton cardiologist as outpatient. In the ED, vitals wnl, labs pertinent for wbc 12.5, H/H 7.9/25.1, BUN/Cr 40/1.9, Na 134, troponin .047, ProBNP 34248, EKG showed normal sinus rhythm, possible Left atrial enlargement, incomplete right bundle branch block.  Pt received Lasix 40mg IV x 1 dose.  Patient's trops were trended, 2nd mildly elevated but 3rd trended downwards, most likely due to demand ischemia.  Cardio consulted (Erica) and patient started on home meds of lasix and coreg, echo done showed moderate aortic stenosis. Preserved L ventricular systolic function EF 60-65. Stage II diastolic dysfxn. Mod-severe pulm htn.  CXR showed increased interstitial lung markings with superimposed bilateral pleural calcifications and/or airspace opacities. Follow up CT chest showed small to moderate bilateral pleural effusions, extensive bilateral pleural calcifications and calcified pleural plaques, finding which may be associated with asbestos related pleural disease. Multifocal airspace and ground-glass opacities may reflect pulmonary edema and/or infection/ pneumonia.  Pulm consulted (Deepthi) believe obstructive and restr lung disease, likely asbestos.  Patient started on incentive spirometry and albuterol.  Patient had US Venous Doppler for lower leg edema, showed no evidence of bilateral lower extremity deep venous thrombosis.  Patient also had elevated creatinine, not sure of baseline but Cr 1.9 on admission, likely prerenal azotemia.  Patient had drop in hemoglobin to 7.5, H/H was closely monitored and type and screen ordered in case of transfusion.  Patient started on hydralazine as was having elevated SBP.  Patient Cr continued to increase and lasix was held as per cardio.  Renal (shira) consulted and recommended ____________.  Repeat CXR performed to assess fluid status showed slight improvement in congestion. Mild vascular congestion interstitial edema persists.      Patient was examined and seen on day of discharge, no acute complaints.  Patient medically optimized to be discharge to ______. 89 yo M with PMHx of HTN, Neuropathy, Hypothyroid, anemia, Gout, s/p calcaneal implanted and recent removal of infected hardware at [place] presented to ED BIB EMS from [facility] for difficulty breathing, dyspnea on exertion and left LE swellings. As per daughter at bedside, pt has been c/o cough with associated chills for few day and she was at  when pt appeared fatigued and not himself and in severe respiratory distress. Pt was given Lasix and albuterol w/o relief thus brought him to ED for further workup. Pt was hospitalized at [place] for infected left calcaneal hardware that was removed, pt was sent home with PICC line and wound vac then sent to  for rehab. Pt has been doing well since and wound has completely healed. Daughter endorses to increasing Left LE swelling and edema of patient for which venous doppler LE done at  neg for DVT.  Pt is baseline anemic but recent CBC at  showed Hb 7.3 and FOBT was done which was negative, pt received Procrit x 1 dose at . No document or known hx of CHF as per daughter, Pt follow up with Dr. Anton cardiologist as outpatient. In the ED, vitals wnl, labs pertinent for wbc 12.5, H/H 7.9/25.1, BUN/Cr 40/1.9, Na 134, troponin .047, ProBNP 70125, EKG showed normal sinus rhythm, possible Left atrial enlargement, incomplete right bundle branch block.  Pt received Lasix 40mg IV x 1 dose.  Patient's trops were trended, 2nd mildly elevated but 3rd trended downwards, most likely due to demand ischemia.  Cardio consulted (Erica) and patient started on home meds of lasix and coreg, echo done showed moderate aortic stenosis. Preserved L ventricular systolic function EF 60-65. Stage II diastolic dysfxn. Mod-severe pulm htn.  CXR showed increased interstitial lung markings with superimposed bilateral pleural calcifications and/or airspace opacities. Follow up CT chest showed small to moderate bilateral pleural effusions, extensive bilateral pleural calcifications and calcified pleural plaques, finding which may be associated with asbestos related pleural disease. Multifocal airspace and ground-glass opacities may reflect pulmonary edema and/or infection/ pneumonia.  Pulm consulted (Deepthi) believe obstructive and restr lung disease, likely asbestos.  Patient started on incentive spirometry and albuterol.  Patient had US Venous Doppler for lower leg edema, showed no evidence of bilateral lower extremity deep venous thrombosis.  Patient also had elevated creatinine, not sure of baseline but Cr 1.9 on admission, likely prerenal azotemia.  Patient had drop in hemoglobin to 7.5, H/H was closely monitored and type and screen ordered in case of transfusion.  Patient started on hydralazine as was having elevated SBP.  Patient Cr continued to increase and lasix was held as per cardio.  Renal (Carmelo) consulted and recommended renal ultrasound, fluid restriction, iron panel and starting procrit 3x/week (for anemia).  Patient was started on oral lasix, and norvasc for BP control.  Repeat CXR performed to assess fluid status showed slight improvement in congestion. Mild vascular congestion interstitial edema persists.  Patient and pateint's daughter want to sign out AMA, understand risks of doing so to patient's medical health.    Vital Signs Last 24 Hrs  T(C): 36.8 (20 Sep 2018 11:44), Max: 37.1 (19 Sep 2018 15:17)  T(F): 98.2 (20 Sep 2018 11:44), Max: 98.7 (19 Sep 2018 15:17)  HR: 72 (20 Sep 2018 14:38) (61 - 72)  BP: 167/75 (20 Sep 2018 14:38) (129/68 - 172/77)  RR: 17 (20 Sep 2018 11:44) (17 - 18)  SpO2: 96% (20 Sep 2018 12:18) (93% - 98%)

## 2018-09-18 NOTE — H&P ADULT - NSHPREVIEWOFSYSTEMS_GEN_ALL_CORE
REVIEW OF SYSTEMS:  CONSTITUTIONAL: fever, chills, fatigue, myalgia, Body ache  EYES: No eye pain, visual disturbances, or discharge  ENMT:  No vertigo, sinus or throat pain  NECK: No pain, stiffness  RESPIRATORY: No cough, wheezing, hemoptysis, shortness of breath  CARDIOVASCULAR: No chest pain, palpitations, leg swelling, dizziness or lightheadedness   GASTROINTESTINAL: No abdominal or epigastric pain, nausea, vomiting, diarrhea or constipation  GENITOURINARY: No dysuria, frequency, urgency, hematuria, or incontinence  NEUROLOGICAL:  No headaches, No dizziness, No numbness, weakness   SKIN:   No itching, burning, rashes, or lesions   MUSCULOSKELETAL: No joint pain or swelling,  muscle pain, back pain, extremity pain  PSYCHIATRIC: No depression, anxiety, mood swings, or difficulty sleeping REVIEW OF SYSTEMS:  CONSTITUTIONAL: fever, chills, fatigue, myalgia, Body ache  EYES: No eye pain, visual disturbances, or discharge  ENMT:  No vertigo, sinus or throat pain  NECK: No pain, stiffness  RESPIRATORY: Described above  CARDIOVASCULAR: No chest pain or palpitations, +leg swelling in Left Ext, no dizziness or lightheadedness   GASTROINTESTINAL: No abdominal or epigastric pain, nausea, vomiting, diarrhea or constipation  GENITOURINARY: No dysuria, frequency, urgency, hematuria, or incontinence  NEUROLOGICAL:  No headaches, No dizziness, No numbness, weakness   SKIN:   No itching, burning, rashes, or lesions   MUSCULOSKELETAL: No joint pain,  muscle pain, back pain, extremity pain  PSYCHIATRIC: No depression, anxiety, mood swings, or difficulty sleeping

## 2018-09-18 NOTE — DISCHARGE NOTE ADULT - PATIENT PORTAL LINK FT
You can access the [facility] Patient Portal, offered by [facility], by registering with the following website: [email or web address]

## 2018-09-18 NOTE — CONSULT NOTE ADULT - PROBLEM SELECTOR RECOMMENDATION 2
Asbestosis  pleural plaques  I emely  albuterol  likely obstructive and restr lung disese  ct chest reviewed

## 2018-09-18 NOTE — DISCHARGE NOTE ADULT - CARE PROVIDERS DIRECT ADDRESSES
[email or web address],DirectAddress_Unknown

## 2018-09-18 NOTE — H&P ADULT - PROBLEM SELECTOR PLAN 1
acute CHF exacerbation, unknown LV function no previous Echo available   Elevated ProBNP 62622, systolic murmur on exam likely AS   CXR prelim read showed bilateral opacity likely chf vs pneumonia   s/p Lasix 40mg IV push in the ED   Continue Lasix IV 40mg daily, strict I/O  f/u Echo to evaluate LV function  may need to obtain records from Dr. Aneesh Anton office in the am at [phone]  mildly elevated troponin likely 2/2 demand ischemia in the setting of CHF   trend CE x 2  daily weight  Cardio Dr. Ruffin's group consulted, f/u further recs

## 2018-09-18 NOTE — CONSULT NOTE ADULT - PROBLEM SELECTOR RECOMMENDATION 3
ct chest reviewed  chronic lung changes  supportive measures  albuterol nebs for sob and or wheezing  tx pulm edema  oral and skin care  asp prec  HOB elev  keep sat > 88 pct  seasonal vaccination  will check Biomarkers  no evidence of PNA on curr exam

## 2018-09-18 NOTE — PROGRESS NOTE ADULT - ATTENDING COMMENTS
Elderly, frail, weak,  does not ambulate.  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   Cardio and Pul eval apprecitaed.  pall care eval for MOLST discussion  supportive medical regimen. PT evalre  nutrition. prognosis guarded Elderly, frail, weak,  does not ambulate.  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   Cardio and Pul eval apprecitaed. Renal consult requested.  pall care eval for MOLST discussion  supportive medical regimen. PT eval  nutrition. prognosis guarded.  Today Pt's daughter wants to take him to [facility] for further treatment. d/w Dr Mohan bernal, she does not have any cardio there to accept pt. She was explained about treatment plan and pt is responding appropriately. Thre is no acute issues to transfer him to [facility]. Called transfer center at [facility] by SW and told to have a accepting physician in hospital. if she still wants to take him pt has to sign AMA and is made aware of all the risks to take pt out from here including death. Elderly, frail, weak,  does not ambulate.  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   Cardio and Pul eval apprecitaed.  pall care eval for MOLST discussion  supportive medical regimen. PT eval  nutrition. prognosis guarded.

## 2018-09-18 NOTE — CONSULT NOTE ADULT - SUBJECTIVE AND OBJECTIVE BOX
[facility]         Aleksandra Ruffin, Babs, Valentine, Omer, Mohan, Erica        [phone] (office)    Reason for Consult:    Interval HPI: Patient seen and examined at bedside. No acute events overnight.     HPI:  89 yo M with PMHx of HTN, Neuropathy, Hypothyroid, anemia, Gout, s/p calcaneal implanted and recent removal of infected hardware at [place] presented to ED BIB EMS from [facility] for difficulty breathing, dyspnea on exertion and left LE swellings. As per daughter at bedside, pt has been c/o cough with associated chills for few day and she was at  this afternoon visit him and pt appeared fatigued and not himself today. She states she left  around 5pm and when she called back 8pm pt c/o difficulty breathing and was unable to talk to her over the phone, when she arrived at  pt was in severe respiratory distress. Pt was given Lasix and albuterol w/o relieve thus she brought him to ED for further workup. Pt was hospitalized at [place] for infected left calcaneal hardware that was removed, pt was sent home with PICC line and wound vac then sent to  for rehab. Pt has been doing well since and wound has completely healed. She endorses to increasing Left LE swelling and edema for which venous doppler LE done at  neg for DVT. Pt states his sob has improved after Lasix in the ED, denies cp, palpitations, dizziness, recent change in his diet, fever, chills. Pt is baseline anemic but recent CBC showed heg 7.3 and FOBT was done which was negative, pt received Procrit x 1 dose at . No document know hx of CHF as per daughter, Pt follow up with Dr. Anton cardiologist as outpatient.     In the ED, vitals wnl, labs pertinent for wbc 12.5, H/H 7.9/25.1, BUN/Cr 40/1.9, Na 134, troponin .047, ProBNP 25979, pt received Lasix 40mg IV x 1 dose.     No family history of CHF (18 Sep 2018 01:31)      PAST MEDICAL & SURGICAL HISTORY:  Gout, unspecified cause, unspecified chronicity, unspecified site  Neuropathy  Hypothyroidism, unspecified type  Anemia, unspecified type  Benign prostatic hyperplasia, unspecified whether lower urinary tract symptoms present  HTN (hypertension)  S/P foot surgery, left      SOCIAL HISTORY: No active tobacco, alcohol or illicit drug use    FAMILY HISTORY:  Denies early onset MI in family or family history of HF or CAD.       Home Medications:  allopurinol 100 mg oral tablet: 1 tab(s) orally once a day (18 Sep 2018 01:14)  ascorbic acid 500 mg oral tablet: 1 tab(s) orally once a day (18 Sep 2018 01:14)  carvedilol 25 mg oral tablet: 1 tab(s) orally 2 times a day (18 Sep 2018 01:14)  cholecalciferol 1000 intl units oral tablet, chewable: 1  orally once a day (18 Sep 2018 01:14)  Colace 100 mg oral capsule: 1 cap(s) orally once a day (18 Sep 2018 01:14)  famotidine 20 mg oral tablet: 1 tab(s) orally once a day (18 Sep 2018 01:14)  ferrous gluconate 324 mg (38 mg elemental iron) oral tablet: 1 tab(s) orally once a day (18 Sep 2018 01:14)  Flomax 0.4 mg oral capsule: 1 cap(s) orally once a day (18 Sep 2018 01:14)  gabapentin 100 mg oral capsule: 1 cap(s) orally once (at bedtime) (18 Sep 2018 01:14)  Levoxyl 50 mcg (0.05 mg) oral tablet: 1 tab(s) orally once a day (18 Sep 2018 01:14)  Milk of Magnesia 8% oral suspension: 15 milliliter(s) orally once a day (at bedtime), As Needed (18 Sep 2018 01:14)  Multiple Vitamins oral capsule: 1 cap(s) orally once a day (18 Sep 2018 01:14)  Oyster Pastor 500 oral tablet: 2 tab(s) orally once a day (18 Sep 2018 01:14)  Proscar 5 mg oral tablet: 1 tab(s) orally once a day (18 Sep 2018 01:14)      MEDICATIONS  (STANDING):  ascorbic acid 500 milliGRAM(s) Oral daily  calcium carbonate   1250 mG (OsCal) 1 Tablet(s) Oral daily  carvedilol 25 milliGRAM(s) Oral every 12 hours  cholecalciferol 1000 Unit(s) Oral daily  enoxaparin Injectable 40 milliGRAM(s) SubCutaneous every 24 hours  ferrous    sulfate 325 milliGRAM(s) Oral daily  finasteride 5 milliGRAM(s) Oral daily  furosemide   Injectable 40 milliGRAM(s) IV Push two times a day  gabapentin 100 milliGRAM(s) Oral at bedtime  influenza   Vaccine 0.5 milliLiter(s) IntraMuscular once  levothyroxine 50 MICROGram(s) Oral daily  multivitamin 1 Tablet(s) Oral daily  pantoprazole    Tablet 40 milliGRAM(s) Oral before breakfast  tamsulosin 0.4 milliGRAM(s) Oral at bedtime    MEDICATIONS  (PRN):  docusate sodium 100 milliGRAM(s) Oral daily PRN Constipation  magnesium hydroxide Suspension 15 milliLiter(s) Oral at bedtime PRN Constipation      Allergies    penicillins (Hives)    Intolerances        REVIEW OF SYSTEMS: Negative except as per HPI.    VITAL SIGNS:   Vital Signs Last 24 Hrs  T(C): 36.4 (18 Sep 2018 07:52), Max: 36.8 (17 Sep 2018 22:09)  T(F): 97.6 (18 Sep 2018 07:52), Max: 98.2 (17 Sep 2018 22:09)  HR: 72 (18 Sep 2018 07:52) (69 - 78)  BP: 175/85 (18 Sep 2018 07:52) (163/82 - 190/98)  BP(mean): 96 (17 Sep 2018 23:51) (96 - 106)  RR: 16 (18 Sep 2018 07:52) (16 - 20)  SpO2: 97% (18 Sep 2018 07:52) (93% - 99%)    I&O's Summary    17 Sep 2018 07:01  -  18 Sep 2018 07:00  --------------------------------------------------------  IN: 120 mL / OUT: 400 mL / NET: -280 mL        PHYSICAL EXAM:  Constitutional: NAD, well-developed  HEENT NC/AT, moist mucous membranes  Pulmonary: Non-labored, breath sounds are clear bilaterally, no wheezing, rales or rhonchi  Cardiovascular: +S1, S2, RRR, no murmur  Gastrointestinal: Soft, nontender, nondistended, normoactive bowel sounds  Extremities: 2+ Pitting edema in LE b/l  Neurological: Alert, strength and sensitivity are grossly intact  Skin: No obvious lesions/rashes  Psych: Mood & affect appropriate    LABS: All Labs Reviewed:                        9.3    10.60 )-----------( 393      ( 18 Sep 2018 04:42 )             29.4                         7.9    12.50 )-----------( 359      ( 17 Sep 2018 22:45 )             25.1     17 Sep 2018 22:45    134    |  103    |  40     ----------------------------<  139    4.9     |  26     |  1.90     Ca    8.6        17 Sep 2018 22:45    TPro  7.3    /  Alb  2.3    /  TBili  0.3    /  DBili  x      /  AST  25     /  ALT  20     /  AlkPhos  20     17 Sep 2018 22:45    PT/INR - ( 17 Sep 2018 22:45 )   PT: 13.7 sec;   INR: 1.25 ratio         PTT - ( 17 Sep 2018 22:45 )  PTT:26.3 sec  CARDIAC MARKERS ( 18 Sep 2018 04:09 )  .050 ng/mL / x     / 44 U/L / x     / 2.7 ng/mL  CARDIAC MARKERS ( 17 Sep 2018 22:45 )  .047 ng/mL / x     / 45 U/L / x     / 3.0 ng/mL      Blood Culture:   09-17 @ 22:45  Pro Bnp [id number]        EKG:    RADIOLOGY:    CXR: [facility]         Aleksandra Ruffin, Babs, Valentine, Omer, Mohan, Erica        [phone] (office)    Reason for Consult:    Interval HPI: Patient seen and examined at bedside. No acute events overnight.     HPI:  89 yo M with PMHx of HTN, Neuropathy, Hypothyroid, anemia, Gout, s/p calcaneal implanted and recent removal of infected hardware at [place] presented to ED BIB EMS from [facility] for difficulty breathing, dyspnea on exertion and left LE swellings. As per daughter at bedside, pt has been c/o cough with associated chills for few day and she was at  this afternoon visit him and pt appeared fatigued and not himself today. She states she left  around 5pm and when she called back 8pm pt c/o difficulty breathing and was unable to talk to her over the phone, when she arrived at  pt was in severe respiratory distress. Pt was given Lasix and albuterol w/o relieve thus she brought him to ED for further workup. Pt was hospitalized at [place] for infected left calcaneal hardware that was removed, pt was sent home with PICC line and wound vac then sent to  for rehab. Pt has been doing well since and wound has completely healed. She endorses to increasing Left LE swelling and edema for which venous doppler LE done at  neg for DVT. Pt states his sob has improved after Lasix in the ED, denies cp, palpitations, dizziness, recent change in his diet, fever, chills. Pt is baseline anemic but recent CBC showed heg 7.3 and FOBT was done which was negative, pt received Procrit x 1 dose at . No document know hx of CHF as per daughter, Pt follow up with Dr. Anton cardiologist as outpatient.     In the ED, vitals wnl, labs pertinent for wbc 12.5, H/H 7.9/25.1, BUN/Cr 40/1.9, Na 134, troponin .047, ProBNP 21299, pt received Lasix 40mg IV x 1 dose.     No family history of CHF (18 Sep 2018 01:31)      PAST MEDICAL & SURGICAL HISTORY:  Gout, unspecified cause, unspecified chronicity, unspecified site  Neuropathy  Hypothyroidism, unspecified type  Anemia, unspecified type  Benign prostatic hyperplasia, unspecified whether lower urinary tract symptoms present  HTN (hypertension)  S/P foot surgery, left      SOCIAL HISTORY: No active tobacco, alcohol or illicit drug use    FAMILY HISTORY:  Denies early onset MI in family or family history of HF or CAD.       Home Medications:  allopurinol 100 mg oral tablet: 1 tab(s) orally once a day (18 Sep 2018 01:14)  ascorbic acid 500 mg oral tablet: 1 tab(s) orally once a day (18 Sep 2018 01:14)  carvedilol 25 mg oral tablet: 1 tab(s) orally 2 times a day (18 Sep 2018 01:14)  cholecalciferol 1000 intl units oral tablet, chewable: 1  orally once a day (18 Sep 2018 01:14)  Colace 100 mg oral capsule: 1 cap(s) orally once a day (18 Sep 2018 01:14)  famotidine 20 mg oral tablet: 1 tab(s) orally once a day (18 Sep 2018 01:14)  ferrous gluconate 324 mg (38 mg elemental iron) oral tablet: 1 tab(s) orally once a day (18 Sep 2018 01:14)  Flomax 0.4 mg oral capsule: 1 cap(s) orally once a day (18 Sep 2018 01:14)  gabapentin 100 mg oral capsule: 1 cap(s) orally once (at bedtime) (18 Sep 2018 01:14)  Levoxyl 50 mcg (0.05 mg) oral tablet: 1 tab(s) orally once a day (18 Sep 2018 01:14)  Milk of Magnesia 8% oral suspension: 15 milliliter(s) orally once a day (at bedtime), As Needed (18 Sep 2018 01:14)  Multiple Vitamins oral capsule: 1 cap(s) orally once a day (18 Sep 2018 01:14)  Oyster Pastor 500 oral tablet: 2 tab(s) orally once a day (18 Sep 2018 01:14)  Proscar 5 mg oral tablet: 1 tab(s) orally once a day (18 Sep 2018 01:14)      MEDICATIONS  (STANDING):  ascorbic acid 500 milliGRAM(s) Oral daily  calcium carbonate   1250 mG (OsCal) 1 Tablet(s) Oral daily  carvedilol 25 milliGRAM(s) Oral every 12 hours  cholecalciferol 1000 Unit(s) Oral daily  enoxaparin Injectable 40 milliGRAM(s) SubCutaneous every 24 hours  ferrous    sulfate 325 milliGRAM(s) Oral daily  finasteride 5 milliGRAM(s) Oral daily  furosemide   Injectable 40 milliGRAM(s) IV Push two times a day  gabapentin 100 milliGRAM(s) Oral at bedtime  influenza   Vaccine 0.5 milliLiter(s) IntraMuscular once  levothyroxine 50 MICROGram(s) Oral daily  multivitamin 1 Tablet(s) Oral daily  pantoprazole    Tablet 40 milliGRAM(s) Oral before breakfast  tamsulosin 0.4 milliGRAM(s) Oral at bedtime    MEDICATIONS  (PRN):  docusate sodium 100 milliGRAM(s) Oral daily PRN Constipation  magnesium hydroxide Suspension 15 milliLiter(s) Oral at bedtime PRN Constipation      Allergies    penicillins (Hives)    Intolerances        REVIEW OF SYSTEMS: Negative except as per HPI.    VITAL SIGNS:   Vital Signs Last 24 Hrs  T(C): 36.4 (18 Sep 2018 07:52), Max: 36.8 (17 Sep 2018 22:09)  T(F): 97.6 (18 Sep 2018 07:52), Max: 98.2 (17 Sep 2018 22:09)  HR: 72 (18 Sep 2018 07:52) (69 - 78)  BP: 175/85 (18 Sep 2018 07:52) (163/82 - 190/98)  BP(mean): 96 (17 Sep 2018 23:51) (96 - 106)  RR: 16 (18 Sep 2018 07:52) (16 - 20)  SpO2: 97% (18 Sep 2018 07:52) (93% - 99%)    I&O's Summary    17 Sep 2018 07:01  -  18 Sep 2018 07:00  --------------------------------------------------------  IN: 120 mL / OUT: 400 mL / NET: -280 mL        PHYSICAL EXAM:  Constitutional: NAD, well-developed  HEENT NC/AT, moist mucous membranes  Pulmonary: Non-labored, breath sounds are clear bilaterally, no wheezing, rales or rhonchi  Cardiovascular: +S1, S2, RRR, no murmur  Gastrointestinal: Soft, nontender, nondistended, normoactive bowel sounds  Extremities: 2+ Pitting edema in LE b/l  Neurological: Alert, strength and sensitivity are grossly intact  Skin: No obvious lesions/rashes  Psych: Mood & affect appropriate    LABS: All Labs Reviewed:                        9.3    10.60 )-----------( 393      ( 18 Sep 2018 04:42 )             29.4                         7.9    12.50 )-----------( 359      ( 17 Sep 2018 22:45 )             25.1     17 Sep 2018 22:45    134    |  103    |  40     ----------------------------<  139    4.9     |  26     |  1.90     Ca    8.6        17 Sep 2018 22:45    TPro  7.3    /  Alb  2.3    /  TBili  0.3    /  DBili  x      /  AST  25     /  ALT  20     /  AlkPhos  20     17 Sep 2018 22:45    PT/INR - ( 17 Sep 2018 22:45 )   PT: 13.7 sec;   INR: 1.25 ratio         PTT - ( 17 Sep 2018 22:45 )  PTT:26.3 sec  CARDIAC MARKERS ( 18 Sep 2018 04:09 )  .050 ng/mL / x     / 44 U/L / x     / 2.7 ng/mL  CARDIAC MARKERS ( 17 Sep 2018 22:45 )  .047 ng/mL / x     / 45 U/L / x     / 3.0 ng/mL      Blood Culture:   09-17 @ 22:45  Pro Bnp [id number]        EKG: SR, LAE, IRBBB    RADIOLOGY:    CXR: increased pulmonary vasculature

## 2018-09-18 NOTE — H&P ADULT - NSHPSOCIALHISTORY_GEN_ALL_CORE
former smoker   Denies EtOH, drugs   Lives with wife at home but coming in from  rehab   uptodate with vaccines  walks and drives car until his foot surgery

## 2018-09-18 NOTE — DISCHARGE NOTE ADULT - CARE PROVIDER_API CALL
Aneesh Anton (MD), Cardiovascular Disease  [address] [place] [address]  Phone: [phone]  Fax: [phone]    Mitch Hernandez (MD; MPH), Foot and Ankle Surgery; Orthopaedic Surgery  [address] [address]  Phone: [phone]  Fax: [phone]    Bayron Mcgrath), Plastic Surgery  [address] [place] [address]  Phone: [phone]  Fax: [phone]

## 2018-09-18 NOTE — H&P ADULT - ASSESSMENT
89 yo M with PMHx of HTN, Neuropathy, Hypothyroid, anemia, Gout, s/p calcaneal implanted and recent removal of infected hardware at [place] presented to ED BIB EMS from [facility] for difficulty breathing, dyspnea on exertion and left LE swellings admitted for acute CHF exacerbation.

## 2018-09-18 NOTE — CONSULT NOTE ADULT - PROBLEM SELECTOR RECOMMENDATION 5
frail  weak  elderly  needs assist with ADL  does not ambulate  pall care eval for MOLST discussion  supportive medical regimen  nutrition

## 2018-09-18 NOTE — H&P ADULT - NSHPPHYSICALEXAM_GEN_ALL_CORE
Height (cm): 185.42 (09-17 @ 22:09)  Weight (kg): 102.1 (09-17 @ 22:09)  BMI (kg/m2): 29.7 (09-17 @ 22:09)  BSA (m2): 2.26 (09-17 @ 22:09)  Vital Signs Last 24 Hrs  T(C): 36.8 (17 Sep 2018 22:09), Max: 36.8 (17 Sep 2018 22:09)  T(F): 98.2 (17 Sep 2018 22:09), Max: 98.2 (17 Sep 2018 22:09)  HR: 69 (18 Sep 2018 01:39) (69 - 77)  BP: 177/73 (18 Sep 2018 01:39) (168/64 - 177/73)  BP(mean): 96 (17 Sep 2018 23:51) (96 - 106)  RR: 18 (18 Sep 2018 01:39) (16 - 20)  SpO2: 96% (18 Sep 2018 01:39) (96% - 99%)  [ ] room air   [x ] 02    PHYSICAL EXAM:  GENERAL:  No acute distress, well appearing, resting comfortably in bed, on oxygen   HEAD:  atraumatic, normocephalic   ENMT: PERRLA, EOMI, moist , no erythema, or exudate   NECK:  Suppled, No JVD   NERVOUS SYSTEM:  Alert & Oriented X3, no focal deficits, strength 5/5, sensation intact, CN intact   CHEST/LUNG: diffused rhonchi, no wheezing, rales or crackles  HEART:  Regular rate and rhythm, 3/6 systolic murmurs, rubs, or gallops  ABDOMEN:  obese, soft, nontender, nondistended, positive bowel soundsx4, no rebound tenderness or guarding    EXTREMITIES: left LE swelling, +2 pitting edema, warm, healed calcaneal wound, dressing c/I/d, s/p calcaneal implant recent removal of hardware 2/2 to infection.    SKIN: no venous stasis skin changes

## 2018-09-18 NOTE — CONSULT NOTE ADULT - SUBJECTIVE AND OBJECTIVE BOX
Date/Time Patient Seen:  		  Referring MD:   Data Reviewed	       Patient is a 89y old  Male who presents with a chief complaint of shortness of breath (18 Sep 2018 08:39)      Subjective/HPI    extensive medical hx  reviewed [facility] records  reviewed  records  reviewed H and P  sob and byrne  ct chest shows chronic lung disease, asbestos and pleural plaques, chf and pulm edema    alert, verbal, coordinated    H&P Adult [Charted Location: [place] [address]] [Authored: 18-Sep-2018 01:31]- for Visit: [id number], Incomplete, Revised, Signed in Full, General    History and Physical:   Source of Information	Chart(s), Patient, Child  Comments/Contacts	Daughter Dolores Mayorga [phone]  Outpatient Providers	WC: Dr. Bayron Mcgrath   Podiatry: Mitch Hernandez   PMD: Dr. Preston   Cardio: Dr. Aneesh Anton     Language:  · Patient/Family of Limited English Proficiency	No       History of Present Illness:  Reason for Admission: shortness of breath  History of Present Illness:   89 yo M with PMHx of HTN, Neuropathy, Hypothyroid, anemia, Gout, s/p calcaneal implanted and recent removal of infected hardware at [place] presented to ED BIB EMS from [facility] for difficulty breathing, dyspnea on exertion and left LE swellings. As per daughter at bedside, pt has been c/o cough with associated chills for few day and she was at  this afternoon visit him and pt appeared fatigued and not himself today. She states she left  around 5pm and when she called back 8pm pt c/o difficulty breathing and was unable to talk to her over the phone, when she arrived at  pt was in severe respiratory distress. Pt was given Lasix and albuterol w/o relieve thus she brought him to ED for further workup. Pt was hospitalized at [place] for infected left calcaneal hardware that was removed, pt was sent home with PICC line and wound vac then sent to  for rehab. Pt has been doing well since and wound has completely healed. She endorses to increasing Left LE swelling and edema for which venous doppler LE done at  neg for DVT. Pt states his sob has improved after Lasix in the ED, denies cp, palpitations, dizziness, recent change in his diet, fever, chills. Pt is baseline anemic but recent CBC showed heg 7.3 and FOBT was done which was negative, pt received Procrit x 1 dose at . No document know hx of CHF as per daughter, Pt follow up with Dr. Anton cardiologist as outpatient.     In the ED, vitals wnl, labs pertinent for wbc 12.5, H/H 7.9/25.1, BUN/Cr 40/1.9, Na 134, troponin .047, ProBNP 39697, pt received Lasix 40mg IV x 1 dose.          PAST MEDICAL & SURGICAL HISTORY:  Gout, unspecified cause, unspecified chronicity, unspecified site  Neuropathy  Hypothyroidism, unspecified type  Anemia, unspecified type  Benign prostatic hyperplasia, unspecified whether lower urinary tract symptoms present  CHF (congestive heart failure)  HTN (hypertension)  S/P foot surgery, left        Medication list         MEDICATIONS  (STANDING):  ALBUTerol    0.083% 2.5 milliGRAM(s) Nebulizer every 8 hours  ascorbic acid 500 milliGRAM(s) Oral daily  calcium carbonate   1250 mG (OsCal) 1 Tablet(s) Oral daily  carvedilol 25 milliGRAM(s) Oral every 12 hours  cholecalciferol 1000 Unit(s) Oral daily  enoxaparin Injectable 40 milliGRAM(s) SubCutaneous every 24 hours  ferrous    sulfate 325 milliGRAM(s) Oral daily  finasteride 5 milliGRAM(s) Oral daily  furosemide   Injectable 40 milliGRAM(s) IV Push two times a day  gabapentin 100 milliGRAM(s) Oral at bedtime  influenza   Vaccine 0.5 milliLiter(s) IntraMuscular once  levothyroxine 50 MICROGram(s) Oral daily  multivitamin 1 Tablet(s) Oral daily  pantoprazole    Tablet 40 milliGRAM(s) Oral before breakfast  tamsulosin 0.4 milliGRAM(s) Oral at bedtime    MEDICATIONS  (PRN):  docusate sodium 100 milliGRAM(s) Oral daily PRN Constipation  magnesium hydroxide Suspension 15 milliLiter(s) Oral at bedtime PRN Constipation         Vitals log        ICU Vital Signs Last 24 Hrs  T(C): 36.4 (18 Sep 2018 07:52), Max: 36.8 (17 Sep 2018 22:09)  T(F): 97.6 (18 Sep 2018 07:52), Max: 98.2 (17 Sep 2018 22:09)  HR: 72 (18 Sep 2018 07:52) (69 - 78)  BP: 175/85 (18 Sep 2018 07:52) (163/82 - 190/98)  BP(mean): 96 (17 Sep 2018 23:51) (96 - 106)  ABP: --  ABP(mean): --  RR: 16 (18 Sep 2018 07:52) (16 - 20)  SpO2: 97% (18 Sep 2018 07:52) (93% - 99%)           Input and Output:  I&O's Detail    17 Sep 2018 07:01  -  18 Sep 2018 07:00  --------------------------------------------------------  IN:    Oral Fluid: 120 mL  Total IN: 120 mL    OUT:    Voided: 400 mL  Total OUT: 400 mL    Total NET: -280 mL          Lab Data                        9.3    10.60 )-----------( 393      ( 18 Sep 2018 04:42 )             29.4     09-17    134<L>  |  103  |  40<H>  ----------------------------<  139<H>  4.9   |  26  |  1.90<H>    Ca    8.6      17 Sep 2018 22:45    TPro  7.3  /  Alb  2.3<L>  /  TBili  0.3  /  DBili  x   /  AST  25  /  ALT  20  /  AlkPhos  20<L>  09-17      CARDIAC MARKERS ( 18 Sep 2018 04:09 )  .050 ng/mL / x     / 44 U/L / x     / 2.7 ng/mL  CARDIAC MARKERS ( 17 Sep 2018 22:45 )  .047 ng/mL / x     / 45 U/L / x     / 3.0 ng/mL    ex smoker    lives at home  retired      Review of Systems	  sob  byrne      Objective     Physical Examination    obese  head at  heart s1s2  lung dec BS  abd soft  cn grossly int    Pertinent Lab findings & Imaging      Pascual:  NO   Adequate UO     I&O's Detail    17 Sep 2018 07:01  -  18 Sep 2018 07:00  --------------------------------------------------------  IN:    Oral Fluid: 120 mL  Total IN: 120 mL    OUT:    Voided: 400 mL  Total OUT: 400 mL    Total NET: -280 mL               Discussed with:     Cultures:	        Radiology

## 2018-09-18 NOTE — DISCHARGE NOTE ADULT - PLAN OF CARE
Stable You were admitted for difficulty breathing, on admission your ProBNP was elevated which is a marker that tells us about the pumping ability of your heart. An echo was done and it showed _________________.  This could have been causing your difficulty breathing as blood backs up when you have an acute exacerbation of congestive heart failure since the heart is not able to pump out blood properly.  You were treated with lasix and coreg.  Please follow up with your cardiologist and PMD within one week after discharge. Because of your difficulty breathing, a chest xray was done which showed some calcifications present on the surround area of your lungs.  A CT chest was performed which showed these calcifications may be due to asbestos.  There is no treatment for this, and is mostly supportive.  Please continue to take your medications and follow up with your PMD. Your kidney function was low at the time of your admission and you were treated with lasix to help remove extra fluid from your body.  This could have been due to the fact that your heart wasn't pumping blood properly and thus preventing your kidneys from receiving the proper amount of blood.  Please continue to take lasix to control the fluid in your body and prevent too much fluid from building up. Please see your PMD within one week after discharge. You were admitted for difficulty breathing, on admission your ProBNP was elevated which is a marker that tells us about the pumping ability of your heart. An echo was done and it showed that the filling ability of the heart was decreased.  This could have been causing your difficulty breathing as blood backs up when you have an acute exacerbation of congestive heart failure since the heart is not able to pump out blood properly.  You were treated with lasix and coreg.  Please follow up with your cardiologist and PMD within one week after discharge.

## 2018-09-18 NOTE — CONSULT NOTE ADULT - ASSESSMENT
89 yo M with PMHx of HTN, Neuropathy, Hypothyroid, anemia, Gout, s/p calcaneal implanted and recent removal of infected hardware at [place] presented to ED BIB EMS from [facility] for difficulty breathing, dyspnea on exertion and left LE swellings      Patient is not complaining of any cardiac symptoms at this time. No acute changes on EKG that demonstrate ischemia or arrythmia. Elevated troponins possible attributed to kidney dysfunction in setting of elevated Cr. Unlikely ischemia with CKmb and CK without an elevation or trend.       Recommendations:     - F/U Echo  - Restart carvedilol  - Continue furosemide 40 mg IV BID  - Monitor and replete lytes, keep K>4, Mg>2.  - Strict I/Os, daily weights.  - Other cardiovascular workup will depend on clinical course.  - All other workup per primary team.  - Will continue to follow. 89 yo M with PMHx of HTN, Neuropathy, Hypothyroid, anemia, Gout, s/p calcaneal implanted and recent removal of infected hardware at [place] presented to ED BIB EMS from [facility] for difficulty breathing, dyspnea on exertion and left LE swellings    Patient is not complaining of any cardiac symptoms at this time. No acute changes on EKG that demonstrate ischemia or arrythmia. Elevated troponins possible attributed to kidney dysfunction in setting of elevated Cr and volume overload. Unlikely ischemia with CK-MB and CK without an elevation or trend.       Recommendations:     - check echocardiogram to evaluate LV function.  - Restart carvedilol 25 mg po bid  - Continue furosemide 40 mg IV BID  - Trend CE  - If BP remains high, can consider nitro (to decrease preload), or add hydralazine.  - Monitor and replete lytes, keep K>4, Mg>2. Watch creatinine, baseline unknown  - Strict I/Os, daily weights.  - Please try to obtain reports from Dr. Anton's office.  - Other cardiovascular workup will depend on clinical course.  - All other workup per primary team.  - Will continue to follow. 89 yo M with PMHx of HTN, Neuropathy, Hypothyroid, anemia, Gout, s/p calcaneal implanted and recent removal of infected hardware at [place] presented to ED BIB EMS from [facility] for difficulty breathing, dyspnea on exertion and left LE swellings    Patient is not complaining of any cardiac symptoms at this time. No acute changes on EKG that demonstrate ischemia or arrythmia. Elevated troponins possible attributed to kidney dysfunction in setting of elevated Cr and volume overload. Unlikely ischemia with CK-MB and CK without an elevation or trend.       Recommendations:     - check echocardiogram to evaluate LV function.  - Restart carvedilol 25 mg po bid  - Continue furosemide 40 mg IV BID  - Trend CE  - If BP remains high, can consider adding isordil or hydralazine.  - Monitor and replete lytes, keep K>4, Mg>2. Watch creatinine, baseline unknown  - Strict I/Os, daily weights.  - Please try to obtain reports from Dr. Anton's office.  - Other cardiovascular workup will depend on clinical course.  - All other workup per primary team.  - Will continue to follow.

## 2018-09-18 NOTE — PROGRESS NOTE ADULT - PROBLEM SELECTOR PLAN 4
- Continue coreg 25mg BID  - As per cardio: if BP remains high, can consider adding isordil or hydralazine.

## 2018-09-19 LAB
ANION GAP SERPL CALC-SCNC: 8 MMOL/L — SIGNIFICANT CHANGE UP (ref 5–17)
BUN SERPL-MCNC: 53 MG/DL — HIGH (ref 7–23)
CALCIUM SERPL-MCNC: 8.5 MG/DL — SIGNIFICANT CHANGE UP (ref 8.5–10.1)
CHLORIDE SERPL-SCNC: 103 MMOL/L — SIGNIFICANT CHANGE UP (ref 96–108)
CO2 SERPL-SCNC: 27 MMOL/L — SIGNIFICANT CHANGE UP (ref 22–31)
CREAT SERPL-MCNC: 2.4 MG/DL — HIGH (ref 0.5–1.3)
GLUCOSE SERPL-MCNC: 98 MG/DL — SIGNIFICANT CHANGE UP (ref 70–99)
HCT VFR BLD CALC: 23.9 % — LOW (ref 39–50)
HGB BLD-MCNC: 7.5 G/DL — LOW (ref 13–17)
MCHC RBC-ENTMCNC: 28 PG — SIGNIFICANT CHANGE UP (ref 27–34)
MCHC RBC-ENTMCNC: 31.4 GM/DL — LOW (ref 32–36)
MCV RBC AUTO: 89.2 FL — SIGNIFICANT CHANGE UP (ref 80–100)
NRBC # BLD: 0 /100 WBCS — SIGNIFICANT CHANGE UP (ref 0–0)
PLATELET # BLD AUTO: 364 K/UL — SIGNIFICANT CHANGE UP (ref 150–400)
POTASSIUM SERPL-MCNC: 4.9 MMOL/L — SIGNIFICANT CHANGE UP (ref 3.5–5.3)
POTASSIUM SERPL-SCNC: 4.9 MMOL/L — SIGNIFICANT CHANGE UP (ref 3.5–5.3)
PROCALCITONIN SERPL-MCNC: 0.57 NG/ML — HIGH (ref 0–0.04)
RBC # BLD: 2.68 M/UL — LOW (ref 4.2–5.8)
RBC # FLD: 16.7 % — HIGH (ref 10.3–14.5)
SODIUM SERPL-SCNC: 138 MMOL/L — SIGNIFICANT CHANGE UP (ref 135–145)
WBC # BLD: 11.68 K/UL — HIGH (ref 3.8–10.5)
WBC # FLD AUTO: 11.68 K/UL — HIGH (ref 3.8–10.5)

## 2018-09-19 PROCEDURE — 99233 SBSQ HOSP IP/OBS HIGH 50: CPT | Mod: GC

## 2018-09-19 PROCEDURE — 99233 SBSQ HOSP IP/OBS HIGH 50: CPT

## 2018-09-19 RX ORDER — CHOLECALCIFEROL (VITAMIN D3) 125 MCG
1 CAPSULE ORAL
Qty: 0 | Refills: 0 | COMMUNITY

## 2018-09-19 RX ORDER — GABAPENTIN 400 MG/1
1 CAPSULE ORAL
Qty: 0 | Refills: 0 | COMMUNITY

## 2018-09-19 RX ORDER — LEVOTHYROXINE SODIUM 125 MCG
1 TABLET ORAL
Qty: 0 | Refills: 0 | COMMUNITY

## 2018-09-19 RX ORDER — ALLOPURINOL 300 MG
1 TABLET ORAL
Qty: 0 | Refills: 0 | COMMUNITY

## 2018-09-19 RX ORDER — HYDRALAZINE HCL 50 MG
25 TABLET ORAL EVERY 8 HOURS
Qty: 0 | Refills: 0 | Status: DISCONTINUED | OUTPATIENT
Start: 2018-09-19 | End: 2018-09-20

## 2018-09-19 RX ORDER — TAMSULOSIN HYDROCHLORIDE 0.4 MG/1
1 CAPSULE ORAL
Qty: 0 | Refills: 0 | COMMUNITY

## 2018-09-19 RX ORDER — DOCUSATE SODIUM 100 MG
0 CAPSULE ORAL
Qty: 0 | Refills: 0 | COMMUNITY

## 2018-09-19 RX ORDER — CHOLECALCIFEROL (VITAMIN D3) 125 MCG
0 CAPSULE ORAL
Qty: 0 | Refills: 0 | COMMUNITY

## 2018-09-19 RX ORDER — CARVEDILOL PHOSPHATE 80 MG/1
1 CAPSULE, EXTENDED RELEASE ORAL
Qty: 0 | Refills: 0 | COMMUNITY

## 2018-09-19 RX ORDER — CARVEDILOL PHOSPHATE 80 MG/1
0 CAPSULE, EXTENDED RELEASE ORAL
Qty: 0 | Refills: 0 | COMMUNITY

## 2018-09-19 RX ORDER — MAGNESIUM HYDROXIDE 400 MG/1
15 TABLET, CHEWABLE ORAL
Qty: 0 | Refills: 0 | COMMUNITY

## 2018-09-19 RX ORDER — CALCIUM CARBONATE 500(1250)
2 TABLET ORAL
Qty: 0 | Refills: 0 | COMMUNITY

## 2018-09-19 RX ORDER — FAMOTIDINE 10 MG/ML
1 INJECTION INTRAVENOUS
Qty: 0 | Refills: 0 | COMMUNITY

## 2018-09-19 RX ORDER — FERROUS GLUCONATE 100 %
1 POWDER (GRAM) MISCELLANEOUS
Qty: 0 | Refills: 0 | COMMUNITY

## 2018-09-19 RX ORDER — DOCUSATE SODIUM 100 MG
1 CAPSULE ORAL
Qty: 0 | Refills: 0 | COMMUNITY

## 2018-09-19 RX ORDER — FINASTERIDE 5 MG/1
1 TABLET, FILM COATED ORAL
Qty: 0 | Refills: 0 | COMMUNITY

## 2018-09-19 RX ORDER — ASCORBIC ACID 60 MG
1 TABLET,CHEWABLE ORAL
Qty: 0 | Refills: 0 | COMMUNITY

## 2018-09-19 RX ORDER — FAMOTIDINE 10 MG/ML
0 INJECTION INTRAVENOUS
Qty: 0 | Refills: 0 | COMMUNITY

## 2018-09-19 RX ADMIN — ALBUTEROL 2.5 MILLIGRAM(S): 90 AEROSOL, METERED ORAL at 07:28

## 2018-09-19 RX ADMIN — Medication 50 MICROGRAM(S): at 05:28

## 2018-09-19 RX ADMIN — CARVEDILOL PHOSPHATE 25 MILLIGRAM(S): 80 CAPSULE, EXTENDED RELEASE ORAL at 17:38

## 2018-09-19 RX ADMIN — ALBUTEROL 2.5 MILLIGRAM(S): 90 AEROSOL, METERED ORAL at 14:39

## 2018-09-19 RX ADMIN — PANTOPRAZOLE SODIUM 40 MILLIGRAM(S): 20 TABLET, DELAYED RELEASE ORAL at 05:28

## 2018-09-19 RX ADMIN — GABAPENTIN 100 MILLIGRAM(S): 400 CAPSULE ORAL at 22:41

## 2018-09-19 RX ADMIN — Medication 1 TABLET(S): at 11:20

## 2018-09-19 RX ADMIN — FINASTERIDE 5 MILLIGRAM(S): 5 TABLET, FILM COATED ORAL at 11:19

## 2018-09-19 RX ADMIN — Medication 25 MILLIGRAM(S): at 14:01

## 2018-09-19 RX ADMIN — Medication 2 TABLET(S): at 11:26

## 2018-09-19 RX ADMIN — Medication 40 MILLIGRAM(S): at 05:28

## 2018-09-19 RX ADMIN — Medication 40 MILLIGRAM(S): at 17:38

## 2018-09-19 RX ADMIN — Medication 325 MILLIGRAM(S): at 11:26

## 2018-09-19 RX ADMIN — ALBUTEROL 2.5 MILLIGRAM(S): 90 AEROSOL, METERED ORAL at 22:58

## 2018-09-19 RX ADMIN — Medication 1000 UNIT(S): at 11:20

## 2018-09-19 RX ADMIN — ENOXAPARIN SODIUM 40 MILLIGRAM(S): 100 INJECTION SUBCUTANEOUS at 07:40

## 2018-09-19 RX ADMIN — TAMSULOSIN HYDROCHLORIDE 0.4 MILLIGRAM(S): 0.4 CAPSULE ORAL at 22:41

## 2018-09-19 RX ADMIN — Medication 25 MILLIGRAM(S): at 22:41

## 2018-09-19 RX ADMIN — Medication 500 MILLIGRAM(S): at 11:20

## 2018-09-19 NOTE — PROGRESS NOTE ADULT - PROBLEM SELECTOR PLAN 1
dyspnea, chronic  multiple medical issues contributing to Dyspnea - HF AS and Asbestosis and Pleural Plaques  I emely  NEBS  Diuresis  will hold off on ABX for now  will monitor clinical picture and sx  ct chest reviewed  [facility] records reviewed  supportive medical regimen  assist with ADL  pt is non ambulatory  pt is full code, GOC documented  LE dopplers neg  will follow and monitor

## 2018-09-19 NOTE — PROGRESS NOTE ADULT - PROBLEM SELECTOR PLAN 1
- Acute CHF exacerbation, unknown LV function no previous Echo available   - Elevated ProBNP 45986, systolic murmur on exam likely AS   - CXR: Increased interstitial lung markings with superimposed bilateral pleural calcifications and/or airspace opacities.   - CT chest:  Bilateral small pleural effusions with associated atelectasis.  Moderate patchy bilateral airspace disease or consolidation, worse at the right lung.  - US Venous Doppler: No evidence of bilateral lower extremity deep venous thrombosis.  - TTE: moderate aortic stenosis. Preserved lt ventricular systolic function.  EF 60-65. Stage II diastolic dysfxn. Mod-severe pulm htn.  - s/p Lasix 40mg IV push in the ED   - Continue Lasix IVP 40mg BID and coreg 25mg q12H, strict I/O, daily wt  - troponins: 0.047-->0.05: mildly elevated likely 2/2 demand ischemia in the setting of CHF, trending down (now 0.038)  - Cardio continues to follow; recs appreciated - Acute CHF exacerbation  - Elevated ProBNP 65416, systolic murmur on exam likely AS   - TTE: moderate aortic stenosis. Preserved L ventricular systolic function.  EF 60-65. Stage II diastolic dysfxn. Mod-severe pulm htn.  - CXR: Increased interstitial lung markings with superimposed bilateral pleural calcifications and/or airspace opacities.   - CT chest:  Bilateral small pleural effusions with associated atelectasis.  Moderate patchy bilateral airspace disease or consolidation, worse at the right lung.  - US Venous Doppler: No evidence of bilateral lower extremity deep venous thrombosis.  - troponins: 0.047-->0.05: mildly elevated likely 2/2 demand ischemia in the setting of CHF, trending down (3rd set 0.038)  - Continue Lasix IVP 40mg BID and coreg 25mg q12H, strict I/O, daily wt  - Cardio continues to follow; recs appreciated

## 2018-09-19 NOTE — PROGRESS NOTE ADULT - PROBLEM SELECTOR PLAN 3
- No baseline Cr to compare acute vs chronic   - Cr: 1.9--> 2.0-->2.4 - No baseline Cr to compare acute vs chronic   - RICKY secondary to dehydration, prerenal azotemia  - Cr: 1.9--> 2.0-->2.4

## 2018-09-19 NOTE — PROGRESS NOTE ADULT - PROBLEM SELECTOR PLAN 4
- Continue coreg 25mg BID  - As per cardio: for -190 give hydralazine 25 mg q8H - Continue coreg 25mg BID  - started hydralazine 25 mg q8H as per cardio: for -190

## 2018-09-19 NOTE — PROGRESS NOTE ADULT - ASSESSMENT
89 yo M with PMHx of HTN, Neuropathy, Hypothyroid, anemia, Gout, s/p calcaneal implanted and recent removal of infected hardware at [place] presented to ED BIB EMS from [facility] for difficulty breathing, dyspnea on exertion and left LE swellings admitted to telemetry for acute CHF exacerbation. 89 yo M with PMHx of HTN, Neuropathy, Hypothyroid, anemia, Gout, s/p calcaneal implanted and recent removal of infected hardware at [place] presented to ED BIB EMS from [facility] for difficulty breathing, dyspnea on exertion and left LE swellings admitted to telemetry for acute CHF exacerbation.

## 2018-09-19 NOTE — PROGRESS NOTE ADULT - PROBLEM SELECTOR PLAN 9
-Pt has hx of anemia, likely chronic.  - Continue to monitor H/H: 7.9/25.1--> 9.3/29.4--> 7.5/23.9 - Pt has hx of anemia, likely chronic.  - Continue to monitor H/H: 7.9/25.1--> 9.3/29.4--> 7.5/23.9  - Type and screen ordered if patient needs transfusion (transfuse Hb <7)

## 2018-09-19 NOTE — PROGRESS NOTE ADULT - SUBJECTIVE AND OBJECTIVE BOX
CHIEF COMPLAINT/INTERVAL HISTORY:  89 yo M with PMHx of HTN, Neuropathy, Hypothyroid, anemia, Gout, s/p calcaneal implanted and recent removal of infected hardware at [place] presented to ED BIB EMS from [facility] for difficulty breathing, dyspnea on exertion and left LE swellings admitted to telemetry for acute CHF exacerbation.     Interval events: no overnight events, morning telemetry: normal sinus rhythm 71    Pt examined at bedside this morning.  Pt reports improvement in dyspnea and SOB since yesterday.  Pt denies chest pain/palpitations, pain in extremities.      REVIEW OF SYSTEMS:  CONSTITUTIONAL: No fever or chills  EYES: No eye pain or visual disturbances  RESPIRATORY: +SOB but improved since yesterday  CARDIOVASCULAR: No chest pain or palpitations  GASTROINTESTINAL: No abdominal or epigastric pain, nausea, vomiting, diarrhea or constipation  GENITOURINARY: No dysuria, frequency, urgency, hematuria, or incontinence  NEUROLOGICAL:  No headaches, No dizziness, No weakness   MUSCULOSKELETAL: No joint pain,  + lower extremity swelling lt worse than rt      Vital Signs Last 24 Hrs  T(C): 36.3 (19 Sep 2018 07:21), Max: 36.9 (18 Sep 2018 16:42)  T(F): 97.4 (19 Sep 2018 07:21), Max: 98.4 (18 Sep 2018 16:42)  HR: 67 (19 Sep 2018 07:30) (66 - 78)  BP: 159/77 (19 Sep 2018 07:21) (151/77 - 171/80)  BP(mean): --  RR: 14 (19 Sep 2018 07:21) (14 - 19)  SpO2: 97% (19 Sep 2018 07:30) (96% - 97%)    PHYSICAL EXAM:  GENERAL: NAD, pleasant  HEENT: EOMI, hearing normal, conjunctiva and sclera clear, nasal cannula in place  Chest: diffuse b/l inspiratory crackles appreciated, no wheezing  CV: +S1S2, RRR, systolic murmur appreciated most prominently in aortic area  GI: soft, +BS, NT/ND  Musculoskeletal: +2 pitting edema in b/l LE, nontender, nonerythematous, lt leg larger in size than rt, lt foot wrapped  Psychiatric: affect nL, mood nL  Skin: warm and dry    LABS:                        7.5    11.68 )-----------( 364      ( 19 Sep 2018 07:40 )             23.9     09-19    138  |  103  |  53<H>  ----------------------------<  98  4.9   |  27  |  2.40<H>    Ca    8.5      19 Sep 2018 07:40    TPro  7.3  /  Alb  2.3<L>  /  TBili  0.3  /  DBili  x   /  AST  25  /  ALT  20  /  AlkPhos  20<L>  09-17    PT/INR - ( 17 Sep 2018 22:45 )   PT: 13.7 sec;   INR: 1.25 ratio         PTT - ( 17 Sep 2018 22:45 )  PTT:26.3 sec      Assessment and Plan: CHIEF COMPLAINT/INTERVAL HISTORY:  89 yo M with PMHx of HTN, Neuropathy, Hypothyroid, anemia, Gout, s/p calcaneal implanted and recent removal of infected hardware at [place] presented to ED BIB EMS from [facility] for difficulty breathing, dyspnea on exertion and left LE swellings admitted to telemetry for acute CHF exacerbation.     Interval events: no overnight events, morning telemetry: normal sinus rhythm 71    Pt examined at bedside this morning.  Pt reports improvement in dyspnea and SOB since yesterday.  Pt denies chest pain/palpitations, pain in extremities.      REVIEW OF SYSTEMS:  CONSTITUTIONAL: No fever or chills  EYES: No eye pain or visual disturbances  RESPIRATORY: +SOB but improved since yesterday  CARDIOVASCULAR: No chest pain or palpitations  GASTROINTESTINAL: No abdominal or epigastric pain, nausea, vomiting, diarrhea or constipation  GENITOURINARY: No dysuria, frequency, urgency, hematuria, or incontinence  NEUROLOGICAL:  No headaches, No dizziness, No weakness   MUSCULOSKELETAL: No joint pain,  + lower extremity swelling lt worse than rt    Vital Signs Last 24 Hrs  T(C): 36.3 (19 Sep 2018 07:21), Max: 36.9 (18 Sep 2018 16:42)  T(F): 97.4 (19 Sep 2018 07:21), Max: 98.4 (18 Sep 2018 16:42)  HR: 67 (19 Sep 2018 07:30) (66 - 78)  BP: 159/77 (19 Sep 2018 07:21) (151/77 - 171/80)  RR: 14 (19 Sep 2018 07:21) (14 - 19)  SpO2: 97% (19 Sep 2018 07:30) (96% - 97%)    PHYSICAL EXAM:  GENERAL: NAD, pleasant  HEENT: EOMI, hearing normal, conjunctiva and sclera clear, nasal cannula in place  Chest: diffuse b/l inspiratory crackles appreciated, no wheezing  CV: +S1S2, RRR, systolic murmur appreciated most prominently in aortic area  GI: soft, +BS, NT/ND  Musculoskeletal: +2 pitting edema in b/l LE, nontender, nonerythematous, L leg larger in size than R, L foot wrapped  Psychiatric: affect nL, mood nL  Skin: warm and dry    LABS:                        7.5    11.68 )-----------( 364      ( 19 Sep 2018 07:40 )             23.9     09-19    138  |  103  |  53<H>  ----------------------------<  98  4.9   |  27  |  2.40<H>    Ca    8.5      19 Sep 2018 07:40    TPro  7.3  /  Alb  2.3<L>  /  TBili  0.3  /  DBili  x   /  AST  25  /  ALT  20  /  AlkPhos  20<L>  09-17  PT/INR - ( 17 Sep 2018 22:45 )   PT: 13.7 sec;   INR: 1.25 ratio    PTT - ( 17 Sep 2018 22:45 )  PTT:26.3 sec

## 2018-09-19 NOTE — PROGRESS NOTE ADULT - SUBJECTIVE AND OBJECTIVE BOX
Date/Time Patient Seen:  		  Referring MD:   Data Reviewed	       Patient is a 89y old  Male who presents with a chief complaint of shortness of breath (18 Sep 2018 16:12)  in bed  seen and examined  vs and meds reviewed      Subjective/HPI     PAST MEDICAL & SURGICAL HISTORY:  Gout, unspecified cause, unspecified chronicity, unspecified site  Neuropathy  Hypothyroidism, unspecified type  Anemia, unspecified type  Benign prostatic hyperplasia, unspecified whether lower urinary tract symptoms present  CHF (congestive heart failure)  HTN (hypertension)  S/P foot surgery, left        Medication list         MEDICATIONS  (STANDING):  ALBUTerol    0.083% 2.5 milliGRAM(s) Nebulizer every 8 hours  ascorbic acid 500 milliGRAM(s) Oral daily  calcium carbonate    500 mG (Tums) Chewable 2 Tablet(s) Chew daily  carvedilol 25 milliGRAM(s) Oral every 12 hours  cholecalciferol 1000 Unit(s) Oral daily  enoxaparin Injectable 40 milliGRAM(s) SubCutaneous every 24 hours  ferrous    sulfate 325 milliGRAM(s) Oral daily  finasteride 5 milliGRAM(s) Oral daily  furosemide   Injectable 40 milliGRAM(s) IV Push two times a day  gabapentin 100 milliGRAM(s) Oral at bedtime  influenza   Vaccine 0.5 milliLiter(s) IntraMuscular once  levothyroxine 50 MICROGram(s) Oral daily  multivitamin 1 Tablet(s) Oral daily  pantoprazole    Tablet 40 milliGRAM(s) Oral before breakfast  tamsulosin 0.4 milliGRAM(s) Oral at bedtime    MEDICATIONS  (PRN):  docusate sodium 100 milliGRAM(s) Oral daily PRN Constipation  magnesium hydroxide Suspension 15 milliLiter(s) Oral at bedtime PRN Constipation         Vitals log        ICU Vital Signs Last 24 Hrs  T(C): 36.3 (19 Sep 2018 04:19), Max: 36.9 (18 Sep 2018 16:42)  T(F): 97.4 (19 Sep 2018 04:19), Max: 98.4 (18 Sep 2018 16:42)  HR: 67 (19 Sep 2018 04:19) (66 - 82)  BP: 151/77 (19 Sep 2018 04:19) (151/77 - 181/89)  BP(mean): --  ABP: --  ABP(mean): --  RR: 19 (19 Sep 2018 04:19) (16 - 19)  SpO2: 97% (19 Sep 2018 04:19) (96% - 99%)           Input and Output:  I&O's Detail    17 Sep 2018 07:01  -  18 Sep 2018 07:00  --------------------------------------------------------  IN:    Oral Fluid: 120 mL  Total IN: 120 mL    OUT:    Voided: 400 mL  Total OUT: 400 mL    Total NET: -280 mL      18 Sep 2018 07:01  -  19 Sep 2018 06:45  --------------------------------------------------------  IN:  Total IN: 0 mL    OUT:    Voided: 800 mL  Total OUT: 800 mL    Total NET: -800 mL          Lab Data                        9.3    10.60 )-----------( 393      ( 18 Sep 2018 04:42 )             29.4     09-18    136  |  103  |  40<H>  ----------------------------<  146<H>  5.2   |  22  |  2.00<H>    Ca    8.8      18 Sep 2018 04:09    TPro  7.3  /  Alb  2.3<L>  /  TBili  0.3  /  DBili  x   /  AST  25  /  ALT  20  /  AlkPhos  20<L>  09-17      CARDIAC MARKERS ( 18 Sep 2018 13:40 )  .038 ng/mL / x     / 34 U/L / x     / 2.5 ng/mL  CARDIAC MARKERS ( 18 Sep 2018 04:09 )  .050 ng/mL / x     / 44 U/L / x     / 2.7 ng/mL  CARDIAC MARKERS ( 17 Sep 2018 22:45 )  .047 ng/mL / x     / 45 U/L / x     / 3.0 ng/mL        Review of Systems	      Objective     Physical Examination    heart s1s2  lung dec BS  abd soft      Pertinent Lab findings & Imaging      Pascual:  NO   Adequate UO     I&O's Detail    17 Sep 2018 07:01  -  18 Sep 2018 07:00  --------------------------------------------------------  IN:    Oral Fluid: 120 mL  Total IN: 120 mL    OUT:    Voided: 400 mL  Total OUT: 400 mL    Total NET: -280 mL      18 Sep 2018 07:01  -  19 Sep 2018 06:45  --------------------------------------------------------  IN:  Total IN: 0 mL    OUT:    Voided: 800 mL  Total OUT: 800 mL    Total NET: -800 mL               Discussed with:     Cultures:	        Radiology

## 2018-09-19 NOTE — PROGRESS NOTE ADULT - ATTENDING COMMENTS
Elderly, frail, weak,  does not ambulate.  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   Cardio and Pul eval apprecitaed. Renal consult requested.  pall care eval for [facility]ST discussion  supportive medical regimen. PT eval  nutrition. prognosis guarded.  Today Pt's daughter wants to take him to [facility] for further treatment. d/w Dr Mohan bernal, she does not have any cardio there to accept pt. She was explained about treatment plan and pt is responding appropriately. Thre is no acute issues to transfer him to [facility]. Called transfer center at [place] by SW and told to have a accepting physician in hospital. if she still wants to take him pt has to sign AMA and is made aware of all the risks to take pt out from here including death.

## 2018-09-19 NOTE — PROGRESS NOTE ADULT - ASSESSMENT
89 yo M with PMHx of HTN, Neuropathy, Hypothyroid, anemia, Gout, s/p calcaneal implanted and recent removal of infected hardware at [place] presented to ED BIB EMS from [facility] for difficulty breathing, dyspnea on exertion and left LE swellings    Patient is not complaining of any cardiac symptoms at this time. No acute changes on EKG that demonstrate ischemia or arrythmia. Elevated troponins possible attributed to kidney dysfunction in setting of elevated Cr and volume overload. Unlikely ischemia with CK-MB and CK without an elevation or trend.       Recommendations:     - check echocardiogram to evaluate LV function.  - Continue carvedilol 25 mg po bid  - Continue furosemide 40 mg IV BID  - CE trended out  - -190 start hydralazine 25mg q 8hrs  - Monitor and replete lytes, keep K>4, Mg>2.   - Watch creatinine, baseline unknown (9/19 Cr. 2.40<--2.0<--1.9)  - Strict I/Os, daily weights.  - Monitor H/H closely (9/19 Hgb 7.5<--9.3<--7.9)  - Please try to obtain reports from Dr. Anton's office.  - Other cardiovascular workup will depend on clinical course.  - All other workup per primary team.  - Will continue to follow. 89 yo M with PMHx of HTN, Neuropathy, Hypothyroid, anemia, Gout, s/p calcaneal implanted and recent removal of infected hardware at [place] presented to ED BIB EMS from [facility] for difficulty breathing, dyspnea on exertion and left LE swellings    Patient is not complaining of any cardiac symptoms at this time. No acute changes on EKG that demonstrate ischemia or arrythmia. Elevated troponins possible attributed to kidney dysfunction in setting of elevated Cr and volume overload. Unlikely ischemia with CK-MB and CK without an elevation or trend.       Recommendations:     - check echocardiogram to evaluate LV function.  - Continue carvedilol 25 mg po bid  - Continue furosemide 40 mg IV BID  - CE trended out  - -190 start hydralazine 25mg q 8hrs  - Monitor and replete lytes, keep K>4, Mg>2.   - Watch creatinine, baseline unknown (9/19 Cr. 2.40<--2.0<--1.9)  - Strict I/Os, daily weights.  - Monitor H/H closely (9/19 Hgb 7.5<--9.3<--7.9)  - 1/11/2018 Last TTE from Dr. Aneesh Anton (pvt cards) [phone]  with EF 65%, mild enlarged RV, severely dilated LA, moderate AS, moderate AR, mild MR, mild TR, moderately elevated pulm pressures, no pericardial effusion. Report left in chart.  - Other cardiovascular workup will depend on clinical course.  - All other workup per primary team.  - Will continue to follow. 89 yo M with PMHx of HTN, Neuropathy, Hypothyroid, anemia, Gout, s/p calcaneal implanted and recent removal of infected hardware at [place] presented to ED BIB EMS from [facility] for difficulty breathing, dyspnea on exertion and left LE swellings    Patient is not complaining of any cardiac symptoms at this time. No acute changes on EKG that demonstrate ischemia or arrythmia. Elevated troponins possible attributed to kidney dysfunction in setting of elevated Cr and volume overload. Unlikely ischemia with CK-MB and CK without an elevation or trend.       Recommendations:     - 1/11/2018 Last TTE from Dr. Aneesh Anton (pvt cards) [phone]  with EF 65%, mild enlarged RV, severely dilated LA, moderate AS, moderate AR, mild MR, mild TR, moderately elevated pulm pressures, no pericardial effusion. Report left in chart.  - echo on 9/18/2018 with mod AS, NL LVfxn, stage II diastolic dysfxn, severe PHTN  - Continue carvedilol 25 mg po bid  - CE trend not c/w ACS.   - -190 start hydralazine 25mg q 8hrs  - Monitor and replete lytes, keep K>4, Mg>2.   - Still appears volume overloaded. Would cont lasix 40mg IV q12 for now. Recheck Cr later today. If still rising would hold lasix.  - Consider renal eval.   - Strict I/Os, daily weights.  - Monitor H/H closely (9/19 Hgb 7.5<--9.3<--7.9)  - Other cardiovascular workup will depend on clinical course.  - All other workup per primary team.  - Will continue to follow.

## 2018-09-19 NOTE — PROGRESS NOTE ADULT - PROBLEM SELECTOR PLAN 2
-Asbestosis, pleural plaques.  -CT chest revealed chronic lung changes.  -Likely obstructive and restr lung dz  - As per pulm (Deepthi): spirometry, duonebs, diuresis, no abx for now -Asbestosis, pleural plaques.  -CT chest revealed chronic lung changes.  -Likely obstructive and restr lung dz  - procalc 0.45 --> 0.57  - CRP 7.37  - As per pulm (Deepthi): spirometry, duonebs, diuresis, no abx for now

## 2018-09-19 NOTE — PROGRESS NOTE ADULT - SUBJECTIVE AND OBJECTIVE BOX
CHIEF COMPLAINT: Patient is a 89y old  Male who presents with a chief complaint of shortness of breath (19 Sep 2018 06:45)      HPI:  89 yo M with PMHx of HTN, Neuropathy, Hypothyroid, anemia, Gout, s/p calcaneal implanted and recent removal of infected hardware at [facility] presented to ED BIB EMS from [facility] for difficulty breathing, dyspnea on exertion and left LE swellings. As per daughter at bedside, pt has been c/o cough with associated chills for few day and she was at  this afternoon visit him and pt appeared fatigued and not himself today. She states she left  around 5pm and when she called back 8pm pt c/o difficulty breathing and was unable to talk to her over the phone, when she arrived at  pt was in severe respiratory distress. Pt was given Lasix and albuterol w/o relieve thus she brought him to ED for further workup. Pt was hospitalized at [facility] for infected left calcaneal hardware that was removed, pt was sent home with PICC line and wound vac then sent to  for rehab. Pt has been doing well since and wound has completely healed. She endorses to increasing Left LE swelling and edema for which venous doppler LE done at  neg for DVT. Pt states his sob has improved after Lasix in the ED, denies cp, palpitations, dizziness, recent change in his diet, fever, chills. Pt is baseline anemic but recent CBC showed heg 7.3 and FOBT was done which was negative, pt received Procrit x 1 dose at . No document know hx of CHF as per daughter, Pt follow up with Dr. Anton cardiologist as outpatient.     In the ED, vitals wnl, labs pertinent for wbc 12.5, H/H 7.9/25.1, BUN/Cr 40/1.9, Na 134, troponin .047, ProBNP 98035, pt received Lasix 40mg IV x 1 dose.     No family history of CHF (18 Sep 2018 01:31)      Follow Up: SOB, GORDON, ADHF    Interval History: Patient seen and examined laying flat in bed comfortably, denies CP, +Dyspnea, orthopnea, and palpitations. NAD.    EKG:  < from: 12 Lead ECG (09.17.18 @ 22:24) >  Ventricular Rate 78 BPM    Atrial Rate 78 BPM    P-R Interval 160 ms    QRS Duration 100 ms    Q-T Interval 414 ms    QTC Calculation(Bezet) 471 ms    P Axis 33 degrees    R Axis -16 degrees    T Axis 19 degrees    Diagnosis Line Normal sinus rhythm  Possible Left atrial enlargement  Incomplete right bundle branch block  Abnormal ECG  No previous ECGs available    REVIEW OF SYSTEMS:    CONSTITUTIONAL: No weakness, fevers or chills  EYES/ENT: No visual changes;  No vertigo or throat pain   NECK: No pain or stiffness  RESPIRATORY: No cough, wheezing, hemoptysis; + shortness of breath  CARDIOVASCULAR: No chest pain or palpitations  GASTROINTESTINAL: No abdominal or epigastric pain. No nausea, vomiting, or hematemesis; No diarrhea or constipation. No melena or hematochezia.  GENITOURINARY: No dysuria, frequency or hematuria  NEUROLOGICAL: No numbness or weakness  SKIN: No itching, rashes      PAST MEDICAL & SURGICAL HISTORY:  Gout, unspecified cause, unspecified chronicity, unspecified site  Neuropathy  Hypothyroidism, unspecified type  Anemia, unspecified type  Benign prostatic hyperplasia, unspecified whether lower urinary tract symptoms present  HTN (hypertension)  S/P foot surgery, left      SOCIAL HISTORY:  No tobacco, ethanol, or drug abuse.    FAMILY HISTORY:  No pertinent family history in first degree relatives    No family history of acute MI or sudden cardiac death.    MEDICATIONS  (STANDING):  ALBUTerol    0.083% 2.5 milliGRAM(s) Nebulizer every 8 hours  ascorbic acid 500 milliGRAM(s) Oral daily  calcium carbonate    500 mG (Tums) Chewable 2 Tablet(s) Chew daily  carvedilol 25 milliGRAM(s) Oral every 12 hours  cholecalciferol 1000 Unit(s) Oral daily  enoxaparin Injectable 40 milliGRAM(s) SubCutaneous every 24 hours  ferrous    sulfate 325 milliGRAM(s) Oral daily  finasteride 5 milliGRAM(s) Oral daily  furosemide   Injectable 40 milliGRAM(s) IV Push two times a day  gabapentin 100 milliGRAM(s) Oral at bedtime  influenza   Vaccine 0.5 milliLiter(s) IntraMuscular once  levothyroxine 50 MICROGram(s) Oral daily  multivitamin 1 Tablet(s) Oral daily  pantoprazole    Tablet 40 milliGRAM(s) Oral before breakfast  tamsulosin 0.4 milliGRAM(s) Oral at bedtime    MEDICATIONS  (PRN):  docusate sodium 100 milliGRAM(s) Oral daily PRN Constipation  magnesium hydroxide Suspension 15 milliLiter(s) Oral at bedtime PRN Constipation      Allergies    penicillins (Hives)    Intolerances    Home meds:  Home Medications:  allopurinol 100 mg oral tablet: 1 tab(s) orally once a day (19 Sep 2018 00:18)  ascorbic acid 500 mg oral tablet: 1 tab(s) orally once a day (19 Sep 2018 00:18)  carvedilol 25 mg oral tablet: 1 tab(s) orally 2 times a day (19 Sep 2018 00:18)  cholecalciferol 1000 intl units oral tablet, chewable: 1  orally once a day (19 Sep 2018 00:18)  Colace 100 mg oral capsule: 1 cap(s) orally once a day (19 Sep 2018 00:18)  famotidine 20 mg oral tablet: 1 tab(s) orally once a day (19 Sep 2018 00:18)  ferrous gluconate 324 mg (38 mg elemental iron) oral tablet: 1 tab(s) orally once a day (19 Sep 2018 00:18)  Flomax 0.4 mg oral capsule: 1 cap(s) orally once a day (19 Sep 2018 00:18)  gabapentin 100 mg oral capsule: 1 cap(s) orally once (at bedtime) (19 Sep 2018 00:18)  Levoxyl 50 mcg (0.05 mg) oral tablet: 1 tab(s) orally once a day (19 Sep 2018 00:18)  Milk of Magnesia 8% oral suspension: 15 milliliter(s) orally once a day (at bedtime), As Needed (19 Sep 2018 00:18)  Multiple Vitamins oral capsule: 1 cap(s) orally once a day (19 Sep 2018 00:18)  Oyster Pastor 500 oral tablet: 2 tab(s) orally once a day (19 Sep 2018 00:18)  Proscar 5 mg oral tablet: 1 tab(s) orally once a day (19 Sep 2018 00:18)      VITAL SIGNS:   Vital Signs Last 24 Hrs  T(C): 36.3 (19 Sep 2018 07:21), Max: 36.9 (18 Sep 2018 16:42)  T(F): 97.4 (19 Sep 2018 07:21), Max: 98.4 (18 Sep 2018 16:42)  HR: 67 (19 Sep 2018 07:30) (66 - 82)  BP: 159/77 (19 Sep 2018 07:21) (151/77 - 181/89)  BP(mean): --  RR: 14 (19 Sep 2018 07:21) (14 - 19)  SpO2: 97% (19 Sep 2018 07:30) (96% - 99%)    I&O's Summary    18 Sep 2018 07:01  -  19 Sep 2018 07:00  --------------------------------------------------------  IN: 0 mL / OUT: 800 mL / NET: -800 mL    19 Sep 2018 07:01  -  19 Sep 2018 09:43  --------------------------------------------------------  IN: 0 mL / OUT: 400 mL / NET: -400 mL      On Exam:  TELE: NSR 60s - 70s  Constitutional: NAD, awake and alert, well-developed  HEENT: Moist Mucous Membranes, Anicteric  Pulmonary: Non-labored, breath sounds are clear bilaterally, No wheezing, rales or rhonchi  Cardiovascular: Regular, S1 and S2, +SYS murmurs, rubs, gallops or clicks  Gastrointestinal: Bowel Sounds present, soft, nontender.   Lymph: 2+ Pitting edema in LE b/l. No lymphadenopathy.  Skin: No visible rashes or ulcers, LE with dressing C/D/I  Psych:  Mood & affect appropriate      LABS: All Labs Reviewed:                        7.5    11.68 )-----------( 364      ( 19 Sep 2018 07:40 )             23.9                         9.3    10.60 )-----------( 393      ( 18 Sep 2018 04:42 )             29.4                         7.9    12.50 )-----------( 359      ( 17 Sep 2018 22:45 )             25.1     19 Sep 2018 07:40    138    |  103    |  53     ----------------------------<  98     4.9     |  27     |  2.40   18 Sep 2018 04:09    136    |  103    |  40     ----------------------------<  146    5.2     |  22     |  2.00   17 Sep 2018 22:45    134    |  103    |  40     ----------------------------<  139    4.9     |  26     |  1.90     Ca    8.5        19 Sep 2018 07:40  Ca    8.8        18 Sep 2018 04:09  Ca    8.6        17 Sep 2018 22:45    TPro  7.3    /  Alb  2.3    /  TBili  0.3    /  DBili  x      /  AST  25     /  ALT  20     /  AlkPhos  20     17 Sep 2018 22:45    PT/INR - ( 17 Sep 2018 22:45 )   PT: 13.7 sec;   INR: 1.25 ratio         PTT - ( 17 Sep 2018 22:45 )  PTT:26.3 sec  CARDIAC MARKERS ( 18 Sep 2018 13:40 )  .038 ng/mL / x     / 34 U/L / x     / 2.5 ng/mL  CARDIAC MARKERS ( 18 Sep 2018 04:09 )  .050 ng/mL / x     / 44 U/L / x     / 2.7 ng/mL  CARDIAC MARKERS ( 17 Sep 2018 22:45 )  .047 ng/mL / x     / 45 U/L / x     / 3.0 ng/mL      Blood Culture:   09-17 @ 22:45  Pro Bnp [id number]    ECHO:  < from: OBSERVATION (09.18.18 @ 11:22) >  CardExmStatus_ExamStatus Exam Completed    RADIOLOGY:  < from: Xray Chest 1 View AP/PA (09.17.18 @ 22:37) >  IMPRESSION:  Increased interstitial lung markings with superimposed bilateral pleural   calcifications and/or airspace opacities. Refer to CT chest exam    Probable small left pleural effusion.    Heart size cannot be accurately assessed in this projection.    < from: US Duplex Venous Lower Ext Complete, Bilateral (09.18.18 @ 10:38) >  IMPRESSION:     No evidence of bilateral lower extremity deep venous thrombosis. CHIEF COMPLAINT: Patient is a 89y old  Male who presents with a chief complaint of shortness of breath (19 Sep 2018 06:45)      HPI:  89 yo M with PMHx of HTN, Neuropathy, Hypothyroid, anemia, Gout, s/p calcaneal implanted and recent removal of infected hardware at [facility] presented to ED BIB EMS from [facility] for difficulty breathing, dyspnea on exertion and left LE swellings. As per daughter at bedside, pt has been c/o cough with associated chills for few day and she was at  this afternoon visit him and pt appeared fatigued and not himself today. She states she left  around 5pm and when she called back 8pm pt c/o difficulty breathing and was unable to talk to her over the phone, when she arrived at  pt was in severe respiratory distress. Pt was given Lasix and albuterol w/o relieve thus she brought him to ED for further workup. Pt was hospitalized at [facility] for infected left calcaneal hardware that was removed, pt was sent home with PICC line and wound vac then sent to  for rehab. Pt has been doing well since and wound has completely healed. She endorses to increasing Left LE swelling and edema for which venous doppler LE done at  neg for DVT. Pt states his sob has improved after Lasix in the ED, denies cp, palpitations, dizziness, recent change in his diet, fever, chills. Pt is baseline anemic but recent CBC showed heg 7.3 and FOBT was done which was negative, pt received Procrit x 1 dose at . No document know hx of CHF as per daughter, Pt follow up with Dr. Anton cardiologist as outpatient.     In the ED, vitals wnl, labs pertinent for wbc 12.5, H/H 7.9/25.1, BUN/Cr 40/1.9, Na 134, troponin .047, ProBNP 28928, pt received Lasix 40mg IV x 1 dose.     No family history of CHF (18 Sep 2018 01:31)      Follow Up: SOB, GORDON, ADHF    Interval History: Patient seen and examined laying flat in bed comfortably, denies CP, +Dyspnea, orthopnea, and palpitations. NAD. Lower ext edema improving.     EKG:  < from: 12 Lead ECG (09.17.18 @ 22:24) >  Ventricular Rate 78 BPM    Atrial Rate 78 BPM    P-R Interval 160 ms    QRS Duration 100 ms    Q-T Interval 414 ms    QTC Calculation(Bezet) 471 ms    P Axis 33 degrees    R Axis -16 degrees    T Axis 19 degrees    Diagnosis Line Normal sinus rhythm  Possible Left atrial enlargement  Incomplete right bundle branch block  Abnormal ECG  No previous ECGs available    REVIEW OF SYSTEMS:    CONSTITUTIONAL: No weakness, fevers or chills  EYES/ENT: No visual changes;  No vertigo or throat pain   NECK: No pain or stiffness  RESPIRATORY: No cough, wheezing, hemoptysis; + shortness of breath  CARDIOVASCULAR: No chest pain or palpitations  GASTROINTESTINAL: No abdominal or epigastric pain. No nausea, vomiting, or hematemesis; No diarrhea or constipation. No melena or hematochezia.  GENITOURINARY: No dysuria, frequency or hematuria  NEUROLOGICAL: No numbness or weakness  SKIN: No itching, rashes      PAST MEDICAL & SURGICAL HISTORY:  Gout, unspecified cause, unspecified chronicity, unspecified site  Neuropathy  Hypothyroidism, unspecified type  Anemia, unspecified type  Benign prostatic hyperplasia, unspecified whether lower urinary tract symptoms present  HTN (hypertension)  S/P foot surgery, left      SOCIAL HISTORY:  No tobacco, ethanol, or drug abuse.    FAMILY HISTORY:  No pertinent family history in first degree relatives    No family history of acute MI or sudden cardiac death.    MEDICATIONS  (STANDING):  ALBUTerol    0.083% 2.5 milliGRAM(s) Nebulizer every 8 hours  ascorbic acid 500 milliGRAM(s) Oral daily  calcium carbonate    500 mG (Tums) Chewable 2 Tablet(s) Chew daily  carvedilol 25 milliGRAM(s) Oral every 12 hours  cholecalciferol 1000 Unit(s) Oral daily  enoxaparin Injectable 40 milliGRAM(s) SubCutaneous every 24 hours  ferrous    sulfate 325 milliGRAM(s) Oral daily  finasteride 5 milliGRAM(s) Oral daily  furosemide   Injectable 40 milliGRAM(s) IV Push two times a day  gabapentin 100 milliGRAM(s) Oral at bedtime  influenza   Vaccine 0.5 milliLiter(s) IntraMuscular once  levothyroxine 50 MICROGram(s) Oral daily  multivitamin 1 Tablet(s) Oral daily  pantoprazole    Tablet 40 milliGRAM(s) Oral before breakfast  tamsulosin 0.4 milliGRAM(s) Oral at bedtime    MEDICATIONS  (PRN):  docusate sodium 100 milliGRAM(s) Oral daily PRN Constipation  magnesium hydroxide Suspension 15 milliLiter(s) Oral at bedtime PRN Constipation      Allergies    penicillins (Hives)    Intolerances    Home meds:  Home Medications:  allopurinol 100 mg oral tablet: 1 tab(s) orally once a day (19 Sep 2018 00:18)  ascorbic acid 500 mg oral tablet: 1 tab(s) orally once a day (19 Sep 2018 00:18)  carvedilol 25 mg oral tablet: 1 tab(s) orally 2 times a day (19 Sep 2018 00:18)  cholecalciferol 1000 intl units oral tablet, chewable: 1  orally once a day (19 Sep 2018 00:18)  Colace 100 mg oral capsule: 1 cap(s) orally once a day (19 Sep 2018 00:18)  famotidine 20 mg oral tablet: 1 tab(s) orally once a day (19 Sep 2018 00:18)  ferrous gluconate 324 mg (38 mg elemental iron) oral tablet: 1 tab(s) orally once a day (19 Sep 2018 00:18)  Flomax 0.4 mg oral capsule: 1 cap(s) orally once a day (19 Sep 2018 00:18)  gabapentin 100 mg oral capsule: 1 cap(s) orally once (at bedtime) (19 Sep 2018 00:18)  Levoxyl 50 mcg (0.05 mg) oral tablet: 1 tab(s) orally once a day (19 Sep 2018 00:18)  Milk of Magnesia 8% oral suspension: 15 milliliter(s) orally once a day (at bedtime), As Needed (19 Sep 2018 00:18)  Multiple Vitamins oral capsule: 1 cap(s) orally once a day (19 Sep 2018 00:18)  Oyster Pastor 500 oral tablet: 2 tab(s) orally once a day (19 Sep 2018 00:18)  Proscar 5 mg oral tablet: 1 tab(s) orally once a day (19 Sep 2018 00:18)      VITAL SIGNS:   Vital Signs Last 24 Hrs  T(C): 36.3 (19 Sep 2018 07:21), Max: 36.9 (18 Sep 2018 16:42)  T(F): 97.4 (19 Sep 2018 07:21), Max: 98.4 (18 Sep 2018 16:42)  HR: 67 (19 Sep 2018 07:30) (66 - 82)  BP: 159/77 (19 Sep 2018 07:21) (151/77 - 181/89)  BP(mean): --  RR: 14 (19 Sep 2018 07:21) (14 - 19)  SpO2: 97% (19 Sep 2018 07:30) (96% - 99%)    I&O's Summary    18 Sep 2018 07:01  -  19 Sep 2018 07:00  --------------------------------------------------------  IN: 0 mL / OUT: 800 mL / NET: -800 mL    19 Sep 2018 07:01  -  19 Sep 2018 09:43  --------------------------------------------------------  IN: 0 mL / OUT: 400 mL / NET: -400 mL      On Exam:  TELE: NSR 60s - 70s  Constitutional: NAD, awake and alert, well-developed  HEENT: Moist Mucous Membranes, Anicteric  Pulmonary: Non-labored, breath sounds are clear bilaterally, No wheezing, rales or rhonchi  Cardiovascular: Regular, S1 and S2, +SYS murmurs, rubs, gallops or clicks  Gastrointestinal: Bowel Sounds present, soft, nontender.   Lymph: 2+ Pitting edema in LE b/l. No lymphadenopathy.  Skin: No visible rashes or ulcers, LE with dressing C/D/I  Psych:  Mood & affect appropriate      LABS: All Labs Reviewed:                        7.5    11.68 )-----------( 364      ( 19 Sep 2018 07:40 )             23.9                         9.3    10.60 )-----------( 393      ( 18 Sep 2018 04:42 )             29.4                         7.9    12.50 )-----------( 359      ( 17 Sep 2018 22:45 )             25.1     19 Sep 2018 07:40    138    |  103    |  53     ----------------------------<  98     4.9     |  27     |  2.40   18 Sep 2018 04:09    136    |  103    |  40     ----------------------------<  146    5.2     |  22     |  2.00   17 Sep 2018 22:45    134    |  103    |  40     ----------------------------<  139    4.9     |  26     |  1.90     Ca    8.5        19 Sep 2018 07:40  Ca    8.8        18 Sep 2018 04:09  Ca    8.6        17 Sep 2018 22:45    TPro  7.3    /  Alb  2.3    /  TBili  0.3    /  DBili  x      /  AST  25     /  ALT  20     /  AlkPhos  20     17 Sep 2018 22:45    PT/INR - ( 17 Sep 2018 22:45 )   PT: 13.7 sec;   INR: 1.25 ratio         PTT - ( 17 Sep 2018 22:45 )  PTT:26.3 sec  CARDIAC MARKERS ( 18 Sep 2018 13:40 )  .038 ng/mL / x     / 34 U/L / x     / 2.5 ng/mL  CARDIAC MARKERS ( 18 Sep 2018 04:09 )  .050 ng/mL / x     / 44 U/L / x     / 2.7 ng/mL  CARDIAC MARKERS ( 17 Sep 2018 22:45 )  .047 ng/mL / x     / 45 U/L / x     / 3.0 ng/mL      Blood Culture:   09-17 @ 22:45  Pro Bnp [id number]    ECHO:  < from: OBSERVATION (09.18.18 @ 11:22) >  CardExmStatus_ExamStatus Exam Completed    RADIOLOGY:  < from: Xray Chest 1 View AP/PA (09.17.18 @ 22:37) >  IMPRESSION:  Increased interstitial lung markings with superimposed bilateral pleural   calcifications and/or airspace opacities. Refer to CT chest exam    Probable small left pleural effusion.    Heart size cannot be accurately assessed in this projection.    < from: US Duplex Venous Lower Ext Complete, Bilateral (09.18.18 @ 10:38) >  IMPRESSION:     No evidence of bilateral lower extremity deep venous thrombosis.       < from: TTE Echo Doppler w/o Cont (09.18.18 @ 11:22) >     EXAM:  ECHO TTE W/O CON COMP W/DOPPLR         PROCEDURE DATE:  09/18/2018        INTERPRETATION:  INDICATION: dyspnea  Referring M.D.:Carly  Blood Pressure 175/85        Weight (kg) :102     Height (cm):185       BSA (sq m): 2.26  Technician: LETITIA    Dimensions:    LA 4.3       Normal Values: 2.0 - 4.0 cm    Ao 3.6        Normal Values: 2.0 - 3.8 cm  SEPTUM 1.6       Normal Values: 0.6 - 1.2 cm  PWT 1.6       Normal Values: 0.6 - 1.1 cm  LVIDd 5.0         Normal Values: 3.0 - 5.6 cm  LVIDs 3.0        Normal Values: 1.8 - 4.0 cm      OBSERVATIONS:  Technically difficult study.  Mitral Valve: MAC with calcified mitral leaflets. Normal opening of the   mitral valve leaflets. Mild mitral regurgitation.  Aortic Valve/Aorta: Calcified trileaflet aortic valve with decreased   opening. The peak transaortic gradient is 30 mmHg with a mean gradient of   19 mmHg. The estimated aortic aortic valve area is estimated to be 1.4 sq   cm consistent with moderate aortic stenosis. Mild AI  Tricuspid Valve: Normal tricuspid valve. Mild tricuspid regurgitation.  Pulmonic Valve: The pulmonic valve is not well visualized. Probably   normal.  Left Atrium: Moderate to severe left atrial enlargement  Right Atrium: Mild right atrial enlargement  Left Ventricle: Endocardium is not well-visualized. Overall there appears   to be preserved left ventricular systolic function. Moderate LVH.. The EF   is approximately 60-65%.  Right Ventricle: Normal right ventricular size and function.  Pericardium/Pleura: Nopericardial effusion noted.  Pulmonary/RV Pressure: The right ventricular systolic pressure is   estimated to be 60mmHg, assuming that the right atrial pressure is   estimated to be 8 mmHg. This is consistent with moderate to severe   pulmonary hypertension.  LV Diastolic Function: Stage II diastolic dysfunction    Conclusion: Technically difficult study. Moderate aortic stenosis.   Preserved left ventricular systolic function. EF 60-65. Stage II   diastolic dysfunction. Moderate to severe pulmonary hypertension                  CHERRIE BOTELLO M.D., ATTENDING CARDIOLOGIST  This document has been electronically signed. Sep 19 2018 11:11AM                < end of copied text >

## 2018-09-20 VITALS — HEART RATE: 72 BPM | DIASTOLIC BLOOD PRESSURE: 75 MMHG | SYSTOLIC BLOOD PRESSURE: 167 MMHG

## 2018-09-20 LAB
ANION GAP SERPL CALC-SCNC: 9 MMOL/L — SIGNIFICANT CHANGE UP (ref 5–17)
APPEARANCE UR: CLEAR — SIGNIFICANT CHANGE UP
BACTERIA # UR AUTO: NEGATIVE — SIGNIFICANT CHANGE UP
BILIRUB UR-MCNC: NEGATIVE — SIGNIFICANT CHANGE UP
BUN SERPL-MCNC: 63 MG/DL — HIGH (ref 7–23)
CALCIUM SERPL-MCNC: 8.2 MG/DL — LOW (ref 8.5–10.1)
CHLORIDE SERPL-SCNC: 101 MMOL/L — SIGNIFICANT CHANGE UP (ref 96–108)
CHLORIDE UR-SCNC: 100 MMOL/L — SIGNIFICANT CHANGE UP
CO2 SERPL-SCNC: 28 MMOL/L — SIGNIFICANT CHANGE UP (ref 22–31)
COLOR SPEC: YELLOW — SIGNIFICANT CHANGE UP
CREAT ?TM UR-MCNC: 22 MG/DL — SIGNIFICANT CHANGE UP
CREAT SERPL-MCNC: 2.5 MG/DL — HIGH (ref 0.5–1.3)
DIFF PNL FLD: NEGATIVE — SIGNIFICANT CHANGE UP
EPI CELLS # UR: NEGATIVE — SIGNIFICANT CHANGE UP
GLUCOSE SERPL-MCNC: 83 MG/DL — SIGNIFICANT CHANGE UP (ref 70–99)
GLUCOSE UR QL: NEGATIVE — SIGNIFICANT CHANGE UP
HCT VFR BLD CALC: 23.4 % — LOW (ref 39–50)
HGB BLD-MCNC: 7.2 G/DL — LOW (ref 13–17)
KETONES UR-MCNC: NEGATIVE — SIGNIFICANT CHANGE UP
LEUKOCYTE ESTERASE UR-ACNC: NEGATIVE — SIGNIFICANT CHANGE UP
MCHC RBC-ENTMCNC: 27.6 PG — SIGNIFICANT CHANGE UP (ref 27–34)
MCHC RBC-ENTMCNC: 30.8 GM/DL — LOW (ref 32–36)
MCV RBC AUTO: 89.7 FL — SIGNIFICANT CHANGE UP (ref 80–100)
NITRITE UR-MCNC: NEGATIVE — SIGNIFICANT CHANGE UP
NRBC # BLD: 0 /100 WBCS — SIGNIFICANT CHANGE UP (ref 0–0)
PH UR: 5 — SIGNIFICANT CHANGE UP (ref 5–8)
PLATELET # BLD AUTO: 362 K/UL — SIGNIFICANT CHANGE UP (ref 150–400)
POTASSIUM SERPL-MCNC: 4 MMOL/L — SIGNIFICANT CHANGE UP (ref 3.5–5.3)
POTASSIUM SERPL-SCNC: 4 MMOL/L — SIGNIFICANT CHANGE UP (ref 3.5–5.3)
PROT ?TM UR-MCNC: 4 MG/DL — SIGNIFICANT CHANGE UP (ref 0–12)
PROT UR-MCNC: NEGATIVE — SIGNIFICANT CHANGE UP
PROT/CREAT UR-RTO: 0.2 RATIO — SIGNIFICANT CHANGE UP (ref 0–0.2)
RBC # BLD: 2.61 M/UL — LOW (ref 4.2–5.8)
RBC # FLD: 16.9 % — HIGH (ref 10.3–14.5)
RBC CASTS # UR COMP ASSIST: NEGATIVE /HPF — SIGNIFICANT CHANGE UP (ref 0–4)
SODIUM SERPL-SCNC: 138 MMOL/L — SIGNIFICANT CHANGE UP (ref 135–145)
SODIUM UR-SCNC: 94 MMOL/L — SIGNIFICANT CHANGE UP
SP GR SPEC: 1 — LOW (ref 1.01–1.02)
UROBILINOGEN FLD QL: NEGATIVE — SIGNIFICANT CHANGE UP
UUN UR-MCNC: 211 MG/DL — SIGNIFICANT CHANGE UP
WBC # BLD: 9.96 K/UL — SIGNIFICANT CHANGE UP (ref 3.8–10.5)
WBC # FLD AUTO: 9.96 K/UL — SIGNIFICANT CHANGE UP (ref 3.8–10.5)
WBC UR QL: NEGATIVE — SIGNIFICANT CHANGE UP

## 2018-09-20 PROCEDURE — 71045 X-RAY EXAM CHEST 1 VIEW: CPT | Mod: 26

## 2018-09-20 PROCEDURE — 12345: CPT | Mod: NC

## 2018-09-20 PROCEDURE — 99233 SBSQ HOSP IP/OBS HIGH 50: CPT

## 2018-09-20 RX ORDER — FUROSEMIDE 40 MG
40 TABLET ORAL DAILY
Qty: 0 | Refills: 0 | Status: DISCONTINUED | OUTPATIENT
Start: 2018-09-20 | End: 2018-09-20

## 2018-09-20 RX ORDER — ERYTHROPOIETIN 10000 [IU]/ML
10000 INJECTION, SOLUTION INTRAVENOUS; SUBCUTANEOUS
Qty: 0 | Refills: 0 | Status: DISCONTINUED | OUTPATIENT
Start: 2018-09-20 | End: 2018-09-20

## 2018-09-20 RX ADMIN — Medication 25 MILLIGRAM(S): at 14:42

## 2018-09-20 RX ADMIN — Medication 1000 UNIT(S): at 11:42

## 2018-09-20 RX ADMIN — FINASTERIDE 5 MILLIGRAM(S): 5 TABLET, FILM COATED ORAL at 11:42

## 2018-09-20 RX ADMIN — ENOXAPARIN SODIUM 40 MILLIGRAM(S): 100 INJECTION SUBCUTANEOUS at 05:20

## 2018-09-20 RX ADMIN — CARVEDILOL PHOSPHATE 25 MILLIGRAM(S): 80 CAPSULE, EXTENDED RELEASE ORAL at 06:45

## 2018-09-20 RX ADMIN — Medication 2 TABLET(S): at 11:43

## 2018-09-20 RX ADMIN — Medication 25 MILLIGRAM(S): at 06:45

## 2018-09-20 RX ADMIN — Medication 50 MICROGRAM(S): at 06:45

## 2018-09-20 RX ADMIN — Medication 325 MILLIGRAM(S): at 11:42

## 2018-09-20 RX ADMIN — Medication 1 TABLET(S): at 11:42

## 2018-09-20 RX ADMIN — Medication 40 MILLIGRAM(S): at 05:20

## 2018-09-20 RX ADMIN — ALBUTEROL 2.5 MILLIGRAM(S): 90 AEROSOL, METERED ORAL at 07:25

## 2018-09-20 RX ADMIN — Medication 500 MILLIGRAM(S): at 11:42

## 2018-09-20 RX ADMIN — ERYTHROPOIETIN 10000 UNIT(S): 10000 INJECTION, SOLUTION INTRAVENOUS; SUBCUTANEOUS at 14:41

## 2018-09-20 RX ADMIN — PANTOPRAZOLE SODIUM 40 MILLIGRAM(S): 20 TABLET, DELAYED RELEASE ORAL at 06:45

## 2018-09-20 NOTE — CONSULT NOTE ADULT - SUBJECTIVE AND OBJECTIVE BOX
Patient is a 89y old  Male who presents with a chief complaint of shortness of breath (20 Sep 2018 08:57)   Acute  renal failure.    HPI:  89 yo M with PMHx of HTN, Neuropathy, Hypothyroid, anemia, Gout, s/p calcaneal implanted and recent removal of infected hardware at [place] presented to ED BIB EMS from [facility] for difficulty breathing, dyspnea on exertion and left LE swellings. As per daughter at bedside, pt has been c/o cough with associated chills for few day and she was at  this afternoon visit him and pt appeared fatigued and not himself today. She states she left  around 5pm and when she called back 8pm pt c/o difficulty breathing and was unable to talk to her over the phone, when she arrived at  pt was in severe respiratory distress. Pt was given Lasix and albuterol w/o relieve thus she brought him to ED for further workup. Pt was hospitalized at [place] for infected left calcaneal hardware that was removed, pt was sent home with PICC line and wound vac then sent to  for rehab. Pt has been doing well since and wound has completely healed. She endorses to increasing Left LE swelling and edema for which venous doppler LE done at  neg for DVT. Pt states his sob has improved after Lasix in the ED, denies cp, palpitations, dizziness, recent change in his diet, fever, chills. Pt is baseline anemic but recent CBC showed heg 7.3 and FOBT was done which was negative, pt received Procrit x 1 dose at . No document know hx of CHF as per daughter, Pt follow up with Dr. Anton cardiologist as outpatient.     In the ED, vitals wnl, labs pertinent for wbc 12.5, H/H 7.9/25.1, BUN/Cr 40/1.9, Na 134, troponin .047, ProBNP 10087, pt received Lasix 40mg IV x 1 dose.     Renal consulted for rise in creatinine with IV diuresis. The patient says he is breathing better. Still had edema. Urinating okay with very light clear urine. Does admit to some lower abdominal tenderness. No chest pain.     No family history of CHF (18 Sep 2018 01:31)   Hx renal stones x1 not aware of type, no other renal  problems; elevated creatinine on admission.    PAST MEDICAL & SURGICAL HISTORY:  Gout, unspecified cause, unspecified chronicity, unspecified site  Neuropathy  Hypothyroidism, unspecified type  Anemia, unspecified type  Benign prostatic hyperplasia, unspecified whether lower urinary tract symptoms present  HTN (hypertension)  S/P foot surgery, left   DM 2, MO, hypothyroidism, prior DVT and IVC filter; Cholecystectomy    FAMILY HISTORY:  No pertinent family history in first degree relatives  [place]    Social History:Non smoker    MEDICATIONS  (STANDING):  ALBUTerol    0.083% 2.5 milliGRAM(s) Nebulizer every 8 hours  ascorbic acid 500 milliGRAM(s) Oral daily  calcium carbonate    500 mG (Tums) Chewable 2 Tablet(s) Chew daily  carvedilol 25 milliGRAM(s) Oral every 12 hours  cholecalciferol 1000 Unit(s) Oral daily  enoxaparin Injectable 40 milliGRAM(s) SubCutaneous every 24 hours  ferrous    sulfate 325 milliGRAM(s) Oral daily  finasteride 5 milliGRAM(s) Oral daily  gabapentin 100 milliGRAM(s) Oral at bedtime  hydrALAZINE 25 milliGRAM(s) Oral every 8 hours  influenza   Vaccine 0.5 milliLiter(s) IntraMuscular once  levothyroxine 50 MICROGram(s) Oral daily  multivitamin 1 Tablet(s) Oral daily  pantoprazole    Tablet 40 milliGRAM(s) Oral before breakfast  tamsulosin 0.4 milliGRAM(s) Oral at bedtime    MEDICATIONS  (PRN):  docusate sodium 100 milliGRAM(s) Oral daily PRN Constipation  magnesium hydroxide Suspension 15 milliLiter(s) Oral at bedtime PRN Constipation   Meds reviewed    Allergies    penicillins (Hives)    Intolerances         REVIEW OF SYSTEMS:    CONSTITUTIONAL:  sob, weight gain  EYES: No eye pain, visual disturbances, or discharge  ENMT:  No difficulty hearing, tinnitus, vertigo; No sinus or throat pain  NECK: No pain or stiffness  BREASTS: No pain, masses, or nipple discharge  RESPIRATORY: sob  CARDIOVASCULAR: No chest pain, palpitations, dizziness,   GASTROINTESTINAL: +Lower abd pain. No nausea, vomiting, or hematemesis; No diarrhea or constipation. No melena   GENITOURINARY: No dysuria, frequency, hematuria, or incontinence  NEUROLOGICAL: No headaches, memory loss, loss of strength, numbness, or tremors  SKIN: no rashes  LYMPH NODES: No enlarged glands  ENDOCRINE: No heat or cold intolerance; No hair loss  MUSCULOSKELETAL: Chronic lymphedema legs with scars  PSYCHIATRIC: No depression, anxiety, mood swings, or difficulty sleeping  HEME/LYMPH: No easy bruising, or bleeding gums  ALLERGY AND IMMUNOLOGIC: No hives or eczema    Vital Signs Last 24 Hrs  T(C): 36.8 (20 Sep 2018 11:44), Max: 37.1 (19 Sep 2018 15:17)  T(F): 98.2 (20 Sep 2018 11:44), Max: 98.7 (19 Sep 2018 15:17)  HR: 68 (20 Sep 2018 11:44) (61 - 72)  BP: 164/72 (20 Sep 2018 11:44) (129/68 - 172/77)  BP(mean): --  RR: 17 (20 Sep 2018 11:44) (17 - 18)  SpO2: 97% (20 Sep 2018 11:44) (93% - 98%)  Daily     Daily Weight in k.7 (20 Sep 2018 04:55)    PHYSICAL EXAM:    GENERAL: Elderly, obese  HEAD:  Atraumatic, Normocephalic  EYES: EOMI, PERRLA, conjunctiva and sclera clear  ENMT: No tonsillar erythema, exudates, or enlargement; Moist mucous membranes, Good dentition, No lesions  NECK: Supple, No JVD  NERVOUS SYSTEM:  Alert & Oriented X3, Good concentration; Motor Strength wnl upper and lower extremities  CHEST/LUNG: Bibasilar rales noted  HEART: Regular rate and rhythm; No murmurs, rubs, or gallops  ABDOMEN: Soft, non-distended, +mild tenderness to lower abdomen over the bladder, +BS  EXTREMITIES:  +2 - +3 leg edema   LYMPH: No lymphadenopathy noted  SKIN: No rashes or lesions, pale    LABS:                        7.2    9.96  )-----------( 362      ( 20 Sep 2018 07:59 )             23.4     09-20    138  |  101  |  63<H>  ----------------------------<  83  4.0   |  28  |  2.50<H>    Ca    8.2<L>      20 Sep 2018 07:59                RADIOLOGY & ADDITIONAL TESTS:

## 2018-09-20 NOTE — CONSULT NOTE ADULT - ATTENDING COMMENTS
Chart reviewed    Patient seen and examined    Agree with plan as outlined
Seen/examined. Agree with above  IV diuresis   Echocardiogram pending.

## 2018-09-20 NOTE — PROGRESS NOTE ADULT - PROBLEM SELECTOR PLAN 1
dyspnea, multifactorial  heart disease, valv heart disease and HFpEF  lung disease, atelectasis, pulm edema, pleural eff, pleural plaques, asbestosis  cont NEBS  cont Diuresis  I and O  BP control  cont tele monitoring  pt is full code  prognosis very poor  daughter is hopeful and optimistic and wishes transfer to [facility]   out of bed as tolerated  dietary discretion  cardio follow up  will follow and monitor  02 support, keep sat > 88 pct

## 2018-09-20 NOTE — PROGRESS NOTE ADULT - PROBLEM SELECTOR PLAN 3
- No baseline Cr to compare acute vs chronic   - RICKY secondary to dehydration, prerenal azotemia  - Hb trending down (7.2 today) transfuse if <7  - Cr: 2.0-->2.4--> 2.5 - No baseline Cr to compare acute vs chronic   - RICKY secondary to dehydration, prerenal azotemia  - Hb trending down (7.2 today) transfuse if <7  - Cr: 2.0-->2.4--> 2.5, will hold lasix  - nephro (Dr. Nagel) consulted, recs appreciated

## 2018-09-20 NOTE — PROGRESS NOTE ADULT - PROBLEM SELECTOR PLAN 2
-Asbestosis, pleural plaques.  -CT chest revealed chronic lung changes.  -Likely obstructive and restr lung dz  - procalc 0.45 --> 0.57  - CRP 7.37  - As per pulm (Deepthi): spirometry, duonebs, diuresis, no abx for now -Asbestosis, pleural plaques.  -CT chest revealed chronic lung changes.  -Likely obstructive and restr lung dz  - procalc 0.45 --> 0.57, CRP 7.37  - As per pulm (Deepthi): spirometry, duonebs, diuresis, no abx for now

## 2018-09-20 NOTE — CONSULT NOTE ADULT - SUBJECTIVE AND OBJECTIVE BOX
CHIEF COMPLAINT: Patient is a 89y old  Male who presents with a chief complaint of shortness of breath (19 Sep 2018 06:45)      HPI:  89 yo M with PMHx of HTN, Neuropathy, Hypothyroid, anemia, Gout, s/p calcaneal implanted and recent removal of infected hardware at [place] presented to ED BIB EMS from [facility] for difficulty breathing, dyspnea on exertion and left LE swellings. As per daughter at bedside, pt has been c/o cough with associated chills for few day and she was at  this afternoon visit him and pt appeared fatigued and not himself today. She states she left  around 5pm and when she called back 8pm pt c/o difficulty breathing and was unable to talk to her over the phone, when she arrived at  pt was in severe respiratory distress. Pt was given Lasix and albuterol w/o relieve thus she brought him to ED for further workup. Pt was hospitalized at [place] for infected left calcaneal hardware that was removed, pt was sent home with PICC line and wound vac then sent to  for rehab. Pt has been doing well since and wound has completely healed. She endorses to increasing Left LE swelling and edema for which venous doppler LE done at  neg for DVT. Pt states his sob has improved after Lasix in the ED, denies cp, palpitations, dizziness, recent change in his diet, fever, chills. Pt is baseline anemic but recent CBC showed heg 7.3 and FOBT was done which was negative, pt received Procrit x 1 dose at . No document know hx of CHF as per daughter, Pt follow up with Dr. Anton cardiologist as outpatient.     In the ED, vitals wnl, labs pertinent for wbc 12.5, H/H 7.9/25.1, BUN/Cr 40/1.9, Na 134, troponin .047, ProBNP 19638, pt received Lasix 40mg IV x 1 dose.     No family history of CHF (18 Sep 2018 01:31)      Follow Up: SOB, GORDON, ADHF    Interval History: Patient seen and examined laying flat in bed comfortably, denies CP, +Dyspnea, orthopnea, and palpitations. NAD. Lower ext edema improving.     EKG:  < from: 12 Lead ECG (09.17.18 @ 22:24) >  Ventricular Rate 78 BPM    Atrial Rate 78 BPM    P-R Interval 160 ms    QRS Duration 100 ms    Q-T Interval 414 ms    QTC Calculation(Bezet) 471 ms    P Axis 33 degrees    R Axis -16 degrees    T Axis 19 degrees    Diagnosis Line Normal sinus rhythm  Possible Left atrial enlargement  Incomplete right bundle branch block  Abnormal ECG  No previous ECGs available    REVIEW OF SYSTEMS:    CONSTITUTIONAL: No weakness, fevers or chills  EYES/ENT: No visual changes;  No vertigo or throat pain   NECK: No pain or stiffness  RESPIRATORY: No cough, wheezing, hemoptysis; + shortness of breath  CARDIOVASCULAR: No chest pain or palpitations  GASTROINTESTINAL: No abdominal or epigastric pain. No nausea, vomiting, or hematemesis; No diarrhea or constipation. No melena or hematochezia.  GENITOURINARY: No dysuria, frequency or hematuria  NEUROLOGICAL: No numbness or weakness  SKIN: No itching, rashes      PAST MEDICAL & SURGICAL HISTORY:  Gout, unspecified cause, unspecified chronicity, unspecified site  Neuropathy  Hypothyroidism, unspecified type  Anemia, unspecified type  Benign prostatic hyperplasia, unspecified whether lower urinary tract symptoms present  HTN (hypertension)  S/P foot surgery, left      SOCIAL HISTORY:  No tobacco, ethanol, or drug abuse.    FAMILY HISTORY:  No pertinent family history in first degree relatives    No family history of acute MI or sudden cardiac death.    MEDICATIONS  (STANDING):  ALBUTerol    0.083% 2.5 milliGRAM(s) Nebulizer every 8 hours  ascorbic acid 500 milliGRAM(s) Oral daily  calcium carbonate    500 mG (Tums) Chewable 2 Tablet(s) Chew daily  carvedilol 25 milliGRAM(s) Oral every 12 hours  cholecalciferol 1000 Unit(s) Oral daily  enoxaparin Injectable 40 milliGRAM(s) SubCutaneous every 24 hours  ferrous    sulfate 325 milliGRAM(s) Oral daily  finasteride 5 milliGRAM(s) Oral daily  furosemide   Injectable 40 milliGRAM(s) IV Push two times a day  gabapentin 100 milliGRAM(s) Oral at bedtime  influenza   Vaccine 0.5 milliLiter(s) IntraMuscular once  levothyroxine 50 MICROGram(s) Oral daily  multivitamin 1 Tablet(s) Oral daily  pantoprazole    Tablet 40 milliGRAM(s) Oral before breakfast  tamsulosin 0.4 milliGRAM(s) Oral at bedtime    MEDICATIONS  (PRN):  docusate sodium 100 milliGRAM(s) Oral daily PRN Constipation  magnesium hydroxide Suspension 15 milliLiter(s) Oral at bedtime PRN Constipation      Allergies    penicillins (Hives)    Intolerances    Home meds:  Home Medications:  allopurinol 100 mg oral tablet: 1 tab(s) orally once a day (19 Sep 2018 00:18)  ascorbic acid 500 mg oral tablet: 1 tab(s) orally once a day (19 Sep 2018 00:18)  carvedilol 25 mg oral tablet: 1 tab(s) orally 2 times a day (19 Sep 2018 00:18)  cholecalciferol 1000 intl units oral tablet, chewable: 1  orally once a day (19 Sep 2018 00:18)  Colace 100 mg oral capsule: 1 cap(s) orally once a day (19 Sep 2018 00:18)  famotidine 20 mg oral tablet: 1 tab(s) orally once a day (19 Sep 2018 00:18)  ferrous gluconate 324 mg (38 mg elemental iron) oral tablet: 1 tab(s) orally once a day (19 Sep 2018 00:18)  Flomax 0.4 mg oral capsule: 1 cap(s) orally once a day (19 Sep 2018 00:18)  gabapentin 100 mg oral capsule: 1 cap(s) orally once (at bedtime) (19 Sep 2018 00:18)  Levoxyl 50 mcg (0.05 mg) oral tablet: 1 tab(s) orally once a day (19 Sep 2018 00:18)  Milk of Magnesia 8% oral suspension: 15 milliliter(s) orally once a day (at bedtime), As Needed (19 Sep 2018 00:18)  Multiple Vitamins oral capsule: 1 cap(s) orally once a day (19 Sep 2018 00:18)  Oyster Pastor 500 oral tablet: 2 tab(s) orally once a day (19 Sep 2018 00:18)  Proscar 5 mg oral tablet: 1 tab(s) orally once a day (19 Sep 2018 00:18)      VITAL SIGNS:   Vital Signs Last 24 Hrs  T(C): 36.4 (20 Sep 2018 07:13), Max: 37.1 (19 Sep 2018 15:17)  T(F): 97.6 (20 Sep 2018 07:13), Max: 98.7 (19 Sep 2018 15:17)  HR: 61 (20 Sep 2018 07:27) (61 - 72)  BP: 144/65 (20 Sep 2018 07:13) (129/68 - 172/77)  BP(mean): --  RR: 18 (20 Sep 2018 04:55) (18 - 18)  SpO2: 93% (20 Sep 2018 07:27) (93% - 98%)    I&O's Summary    18 Sep 2018 07:01  -  19 Sep 2018 07:00  --------------------------------------------------------  IN: 0 mL / OUT: 800 mL / NET: -800 mL    19 Sep 2018 07:01  -  19 Sep 2018 09:43  --------------------------------------------------------  IN: 0 mL / OUT: 400 mL / NET: -400 mL      On Exam:  TELE: NSR 60s - 70s  Constitutional: NAD, awake and alert, well-developed  HEENT: Moist Mucous Membranes, Anicteric  Pulmonary: Non-labored, breath sounds are clear bilaterally, No wheezing, rales or rhonchi  Cardiovascular: Regular, S1 and S2, +SYS murmurs, rubs, gallops or clicks  Gastrointestinal: Bowel Sounds present, soft, nontender.   Lymph: 2+ Pitting edema in LE b/l. No lymphadenopathy.  Skin: No visible rashes or ulcers, LE with dressing C/D/I  Psych:  Mood & affect appropriate      LABS: All Labs Reviewed:                                 7.2    9.96  )-----------( 362      ( 20 Sep 2018 07:59 )             23.4     20 Sep 2018 07:59    138    |  101    |  63     ----------------------------<  83     4.0     |  28     |  2.50     Ca    8.2        20 Sep 2018 07:59          CAPILLARY BLOOD GLUCOSE        CARDIAC MARKERS ( 18 Sep 2018 13:40 )  .038 ng/mL / x     / 34 U/L / x     / 2.5 ng/mL          Blood Culture:   09-17 @ 22:45  Pro Bnp [id number]    ECHO:  < from: OBSERVATION (09.18.18 @ 11:22) >  CardExmStatus_ExamStatus Exam Completed    RADIOLOGY:  < from: Xray Chest 1 View AP/PA (09.17.18 @ 22:37) >  IMPRESSION:  Increased interstitial lung markings with superimposed bilateral pleural   calcifications and/or airspace opacities. Refer to CT chest exam    Probable small left pleural effusion.    Heart size cannot be accurately assessed in this projection.    < from: US Duplex Venous Lower Ext Complete, Bilateral (09.18.18 @ 10:38) >  IMPRESSION:     No evidence of bilateral lower extremity deep venous thrombosis.       < from: TTE Echo Doppler w/o Cont (09.18.18 @ 11:22) >     EXAM:  ECHO TTE W/O CON COMP W/DOPPLR         PROCEDURE DATE:  09/18/2018        INTERPRETATION:  INDICATION: dyspnea  Referring M.D.:Carly  Blood Pressure 175/85        Weight (kg) :102     Height (cm):185       BSA (sq m): 2.26  Technician: LETITIA    Dimensions:    LA 4.3       Normal Values: 2.0 - 4.0 cm    Ao 3.6        Normal Values: 2.0 - 3.8 cm  SEPTUM 1.6       Normal Values: 0.6 - 1.2 cm  PWT 1.6       Normal Values: 0.6 - 1.1 cm  LVIDd 5.0         Normal Values: 3.0 - 5.6 cm  LVIDs 3.0        Normal Values: 1.8 - 4.0 cm      OBSERVATIONS:  Technically difficult study.  Mitral Valve: MAC with calcified mitral leaflets. Normal opening of the   mitral valve leaflets. Mild mitral regurgitation.  Aortic Valve/Aorta: Calcified trileaflet aortic valve with decreased   opening. The peak transaortic gradient is 30 mmHg with a mean gradient of   19 mmHg. The estimated aortic aortic valve area is estimated to be 1.4 sq   cm consistent with moderate aortic stenosis. Mild AI  Tricuspid Valve: Normal tricuspid valve. Mild tricuspid regurgitation.  Pulmonic Valve: The pulmonic valve is not well visualized. Probably   normal.  Left Atrium: Moderate to severe left atrial enlargement  Right Atrium: Mild right atrial enlargement  Left Ventricle: Endocardium is not well-visualized. Overall there appears   to be preserved left ventricular systolic function. Moderate LVH.. The EF   is approximately 60-65%.  Right Ventricle: Normal right ventricular size and function.  Pericardium/Pleura: Nopericardial effusion noted.  Pulmonary/RV Pressure: The right ventricular systolic pressure is   estimated to be 60mmHg, assuming that the right atrial pressure is   estimated to be 8 mmHg. This is consistent with moderate to severe   pulmonary hypertension.  LV Diastolic Function: Stage II diastolic dysfunction    Conclusion: Technically difficult study. Moderate aortic stenosis.   Preserved left ventricular systolic function. EF 60-65. Stage II   diastolic dysfunction. Moderate to severe pulmonary hypertension                  CHERRIE BOTELLO M.D., ATTENDING CARDIOLOGIST  This document has been electronically signed. Sep 19 2018 11:11AM                < end of copied text >

## 2018-09-20 NOTE — PROGRESS NOTE ADULT - ATTENDING COMMENTS
Pt feeling better, No SOB, CP.  O2 sta 99% on RA.  Still has leg edema, improved from yesterday.  D/W renal and cardio. as renal functions are getting worse, will decrease lasix.   Family and PT wants to be transferred to [facility]. spoke with Cardio Sonny at [facility] who did not advice for transfer. Family is adamant to take him and signed him out AMA. Pt and family made aware of all risks including decompensation of heart, respiratory distress and even death.  Son and [facility] bed side,Pt signed out.

## 2018-09-20 NOTE — PROGRESS NOTE ADULT - PROBLEM SELECTOR PLAN 1
- Acute CHF exacerbation  - TTE: moderate aortic stenosis. Preserved L ventricular systolic function.  EF 60-65. Stage II diastolic dysfxn. Mod-severe pulm htn.  - Continue Lasix IVP 40mg BID and coreg 25mg q12H, strict I/O, daily wt  - Cr trending up (2.5 today)  - Cardio continues to follow; recs appreciated - Acute CHF exacerbation  - TTE: moderate aortic stenosis. Preserved L ventricular systolic function.  EF 60-65. Stage II diastolic dysfxn. Mod-severe pulm htn.  - held Lasix IVP 40mg BID due to elevated Cr   - c/w coreg 25mg q12H, strict I/O, daily wt  - Cr trending up (2.5 today)  - Cardio continues to follow; recs appreciated - Acute CHF exacerbation  - TTE: moderate aortic stenosis. Preserved L ventricular systolic function.  EF 60-65. Stage II diastolic dysfxn. Mod-severe pulm htn.  - held Lasix IVP 40mg BID due to elevated Cr   - Cr trending up (2.5 today)  - c/w coreg 25mg q12H, strict I/O, daily wt  - f/u repeat CXR to assess fluid status  - Cardio continues to follow; recs appreciated

## 2018-09-20 NOTE — PROGRESS NOTE ADULT - SUBJECTIVE AND OBJECTIVE BOX
Date/Time Patient Seen:  		  Referring MD:   Data Reviewed	       Patient is a 89y old  Male who presents with a chief complaint of shortness of breath (19 Sep 2018 11:22)  in bed  seen and examined  vs and meds reviewed      Subjective/HPI     PAST MEDICAL & SURGICAL HISTORY:  Gout, unspecified cause, unspecified chronicity, unspecified site  Neuropathy  Hypothyroidism, unspecified type  Anemia, unspecified type  Benign prostatic hyperplasia, unspecified whether lower urinary tract symptoms present  CHF (congestive heart failure)  HTN (hypertension)  S/P foot surgery, left        Medication list         MEDICATIONS  (STANDING):  ALBUTerol    0.083% 2.5 milliGRAM(s) Nebulizer every 8 hours  ascorbic acid 500 milliGRAM(s) Oral daily  calcium carbonate    500 mG (Tums) Chewable 2 Tablet(s) Chew daily  carvedilol 25 milliGRAM(s) Oral every 12 hours  cholecalciferol 1000 Unit(s) Oral daily  enoxaparin Injectable 40 milliGRAM(s) SubCutaneous every 24 hours  ferrous    sulfate 325 milliGRAM(s) Oral daily  finasteride 5 milliGRAM(s) Oral daily  furosemide   Injectable 40 milliGRAM(s) IV Push two times a day  gabapentin 100 milliGRAM(s) Oral at bedtime  hydrALAZINE 25 milliGRAM(s) Oral every 8 hours  influenza   Vaccine 0.5 milliLiter(s) IntraMuscular once  levothyroxine 50 MICROGram(s) Oral daily  multivitamin 1 Tablet(s) Oral daily  pantoprazole    Tablet 40 milliGRAM(s) Oral before breakfast  tamsulosin 0.4 milliGRAM(s) Oral at bedtime    MEDICATIONS  (PRN):  docusate sodium 100 milliGRAM(s) Oral daily PRN Constipation  magnesium hydroxide Suspension 15 milliLiter(s) Oral at bedtime PRN Constipation         Vitals log        ICU Vital Signs Last 24 Hrs  T(C): 36.5 (20 Sep 2018 04:55), Max: 37.1 (19 Sep 2018 15:17)  T(F): 97.7 (20 Sep 2018 04:55), Max: 98.7 (19 Sep 2018 15:17)  HR: 69 (20 Sep 2018 04:55) (66 - 72)  BP: 151/75 (20 Sep 2018 04:55) (129/68 - 172/77)  BP(mean): --  ABP: --  ABP(mean): --  RR: 18 (20 Sep 2018 04:55) (14 - 18)  SpO2: 97% (20 Sep 2018 04:55) (96% - 98%)           Input and Output:  I&O's Detail    18 Sep 2018 07:01  -  19 Sep 2018 07:00  --------------------------------------------------------  IN:  Total IN: 0 mL    OUT:    Voided: 800 mL  Total OUT: 800 mL    Total NET: -800 mL      19 Sep 2018 07:01  -  20 Sep 2018 06:47  --------------------------------------------------------  IN:    Oral Fluid: 360 mL  Total IN: 360 mL    OUT:    Voided: 2100 mL  Total OUT: 2100 mL    Total NET: -1740 mL          Lab Data                        7.5    11.68 )-----------( 364      ( 19 Sep 2018 07:40 )             23.9     09-19    138  |  103  |  53<H>  ----------------------------<  98  4.9   |  27  |  2.40<H>    Ca    8.5      19 Sep 2018 07:40        CARDIAC MARKERS ( 18 Sep 2018 13:40 )  .038 ng/mL / x     / 34 U/L / x     / 2.5 ng/mL        Review of Systems	      Objective     Physical Examination    heart s1s2  lung dec BS  abd soft      Pertinent Lab findings & Imaging      Pascual:  NO   Adequate UO     I&O's Detail    18 Sep 2018 07:01  -  19 Sep 2018 07:00  --------------------------------------------------------  IN:  Total IN: 0 mL    OUT:    Voided: 800 mL  Total OUT: 800 mL    Total NET: -800 mL      19 Sep 2018 07:01  -  20 Sep 2018 06:47  --------------------------------------------------------  IN:    Oral Fluid: 360 mL  Total IN: 360 mL    OUT:    Voided: 2100 mL  Total OUT: 2100 mL    Total NET: -1740 mL               Discussed with:     Cultures:	        Radiology

## 2018-09-20 NOTE — PROGRESS NOTE ADULT - ASSESSMENT
89 yo M with PMHx of HTN, Neuropathy, Hypothyroid, anemia, Gout, s/p calcaneal implanted and recent removal of infected hardware at [place] presented to ED BIB EMS from [facility] for difficulty breathing, dyspnea on exertion and left LE swellings admitted to telemetry for acute CHF exacerbation.

## 2018-09-20 NOTE — PROGRESS NOTE ADULT - SUBJECTIVE AND OBJECTIVE BOX
Resident Progress Note    Patient is a 89y old  Male who presents with a chief complaint of shortness of breath (20 Sep 2018 06:47)    HPI: Patient seen and examined at bedside. No acute events overnight. Patient was sleeping and very tired.  States that his legs feel weak.    REVIEW OF SYSTEMS:  CONSTITUTIONAL: No fever or chills  EYES: No eye pain or visual disturbances  RESPIRATORY: +SOB but improved since yesterday  CARDIOVASCULAR: No chest pain or palpitations  GASTROINTESTINAL: No abdominal or epigastric pain, nausea, vomiting, diarrhea or constipation  GENITOURINARY: No dysuria, frequency, urgency, hematuria, or incontinence  NEUROLOGICAL:  No headaches, No dizziness, No weakness   MUSCULOSKELETAL: No joint pain,  + lower extremity swelling lt worse than rt, + weakness of lower extremities     Vital Signs Last 24 Hrs  T(C): 36.4 (09-20-18 @ 07:13), Max: 37.1 (09-19-18 @ 15:17)  T(F): 97.6 (09-20-18 @ 07:13), Max: 98.7 (09-19-18 @ 15:17)  HR: 61 (09-20-18 @ 07:27) (61 - 72)  BP: 144/65 (09-20-18 @ 07:13) (129/68 - 172/77)  RR: 18 (09-20-18 @ 04:55) (18 - 18)  SpO2: 93% (09-20-18 @ 07:27) (93% - 98%)    PHYSICAL EXAM:  GENERAL: NAD, pleasant, sleepy  HEENT: PERRL, hearing normal, conjunctiva and sclera clear, nasal cannula in place  Chest: diffuse b/l inspiratory crackles appreciated, no wheezing  CV: +S1S2, RRR, systolic murmur appreciated most prominently in aortic area  GI: soft, +BS, NT/ND  Musculoskeletal: nonpitting edema in b/l LE, nontender, nonerythematous, L leg larger in size than R, L foot wrapped    LABS:                        7.2    9.96  )-----------( 362      ( 20 Sep 2018 07:59 )             23.4     20 Sep 2018 07:59    138    |  101    |  63     ----------------------------<  83     4.0     |  28     |  2.50     Ca    8.2        20 Sep 2018 07:59    CARDIAC MARKERS ( 18 Sep 2018 13:40 )  .038 ng/mL / x     / 34 U/L / x     / 2.5 ng/mL    MEDICATIONS  (STANDING):  ALBUTerol    0.083% 2.5 milliGRAM(s) Nebulizer every 8 hours  ascorbic acid 500 milliGRAM(s) Oral daily  calcium carbonate    500 mG (Tums) Chewable 2 Tablet(s) Chew daily  carvedilol 25 milliGRAM(s) Oral every 12 hours  cholecalciferol 1000 Unit(s) Oral daily  enoxaparin Injectable 40 milliGRAM(s) SubCutaneous every 24 hours  ferrous    sulfate 325 milliGRAM(s) Oral daily  finasteride 5 milliGRAM(s) Oral daily  furosemide   Injectable 40 milliGRAM(s) IV Push two times a day  gabapentin 100 milliGRAM(s) Oral at bedtime  hydrALAZINE 25 milliGRAM(s) Oral every 8 hours  influenza   Vaccine 0.5 milliLiter(s) IntraMuscular once  levothyroxine 50 MICROGram(s) Oral daily  multivitamin 1 Tablet(s) Oral daily  pantoprazole    Tablet 40 milliGRAM(s) Oral before breakfast  tamsulosin 0.4 milliGRAM(s) Oral at bedtime    MEDICATIONS  (PRN):  docusate sodium 100 milliGRAM(s) Oral daily PRN Constipation  magnesium hydroxide Suspension 15 milliLiter(s) Oral at bedtime PRN Constipation    Allergies  penicillins (Hives) Resident Progress Note    Patient is a 89y old  Male who presents with a chief complaint of shortness of breath (20 Sep 2018 06:47)    HPI: Patient seen and examined at bedside. No acute events overnight. Patient was sleeping and very tired.  States that his legs feel weak.    REVIEW OF SYSTEMS:  CONSTITUTIONAL: No fever or chills  EYES: No eye pain or visual disturbances  RESPIRATORY: +SOB but improved since yesterday  CARDIOVASCULAR: No chest pain or palpitations  GASTROINTESTINAL: No abdominal or epigastric pain, nausea, vomiting, diarrhea or constipation  GENITOURINARY: No dysuria, frequency, urgency, hematuria, or incontinence  NEUROLOGICAL:  No headaches, No dizziness, No weakness   MUSCULOSKELETAL: No joint pain,  + lower extremity swelling lt worse than rt, + weakness of lower extremities     Vital Signs Last 24 Hrs  T(C): 36.4 (09-20-18 @ 07:13), Max: 37.1 (09-19-18 @ 15:17)  T(F): 97.6 (09-20-18 @ 07:13), Max: 98.7 (09-19-18 @ 15:17)  HR: 61 (09-20-18 @ 07:27) (61 - 72)  BP: 144/65 (09-20-18 @ 07:13) (129/68 - 172/77)  RR: 18 (09-20-18 @ 04:55) (18 - 18)  SpO2: 93% (09-20-18 @ 07:27) (93% - 98%)    PHYSICAL EXAM:  GENERAL: NAD, pleasant, sleepy  HEENT: PERRL, hearing normal, conjunctiva and sclera clear, nasal cannula in place  Chest: diffuse b/l inspiratory crackles appreciated, no wheezing  CV: +S1S2, RRR, systolic murmur appreciated most prominently in aortic area  GI: soft, +BS, NT/ND  Musculoskeletal: 2+ pitting edema in b/l LE, nontender, nonerythematous, L leg larger in size than R, L foot wrapped    LABS:                        7.2    9.96  )-----------( 362      ( 20 Sep 2018 07:59 )             23.4     20 Sep 2018 07:59    138    |  101    |  63     ----------------------------<  83     4.0     |  28     |  2.50     Ca    8.2        20 Sep 2018 07:59    CARDIAC MARKERS ( 18 Sep 2018 13:40 )  .038 ng/mL / x     / 34 U/L / x     / 2.5 ng/mL    MEDICATIONS  (STANDING):  ALBUTerol    0.083% 2.5 milliGRAM(s) Nebulizer every 8 hours  ascorbic acid 500 milliGRAM(s) Oral daily  calcium carbonate    500 mG (Tums) Chewable 2 Tablet(s) Chew daily  carvedilol 25 milliGRAM(s) Oral every 12 hours  cholecalciferol 1000 Unit(s) Oral daily  enoxaparin Injectable 40 milliGRAM(s) SubCutaneous every 24 hours  ferrous    sulfate 325 milliGRAM(s) Oral daily  finasteride 5 milliGRAM(s) Oral daily  furosemide   Injectable 40 milliGRAM(s) IV Push two times a day  gabapentin 100 milliGRAM(s) Oral at bedtime  hydrALAZINE 25 milliGRAM(s) Oral every 8 hours  influenza   Vaccine 0.5 milliLiter(s) IntraMuscular once  levothyroxine 50 MICROGram(s) Oral daily  multivitamin 1 Tablet(s) Oral daily  pantoprazole    Tablet 40 milliGRAM(s) Oral before breakfast  tamsulosin 0.4 milliGRAM(s) Oral at bedtime    MEDICATIONS  (PRN):  docusate sodium 100 milliGRAM(s) Oral daily PRN Constipation  magnesium hydroxide Suspension 15 milliLiter(s) Oral at bedtime PRN Constipation    Allergies  penicillins (Hives) Resident Progress Note    Patient is a 89y old  Male who presents with a chief complaint of shortness of breath (20 Sep 2018 06:47)    HPI: Patient seen and examined at bedside. No acute events overnight. Patient was sleeping and very tired.  States that his legs feel weak. otherwise feels better.    REVIEW OF SYSTEMS:  CONSTITUTIONAL: No fever or chills  EYES: No eye pain or visual disturbances  RESPIRATORY: +SOB but improved since yesterday  CARDIOVASCULAR: No chest pain or palpitations  GASTROINTESTINAL: No abdominal or epigastric pain, nausea, vomiting, diarrhea or constipation  GENITOURINARY: No dysuria, frequency, urgency, hematuria, or incontinence  NEUROLOGICAL:  No headaches, No dizziness, No weakness   MUSCULOSKELETAL: No joint pain,  + lower extremity swelling lt worse than rt, + weakness of lower extremities     Vital Signs Last 24 Hrs  T(C): 36.4 (09-20-18 @ 07:13), Max: 37.1 (09-19-18 @ 15:17)  T(F): 97.6 (09-20-18 @ 07:13), Max: 98.7 (09-19-18 @ 15:17)  HR: 61 (09-20-18 @ 07:27) (61 - 72)  BP: 144/65 (09-20-18 @ 07:13) (129/68 - 172/77)  RR: 18 (09-20-18 @ 04:55) (18 - 18)  SpO2: 93% (09-20-18 @ 07:27) (93% - 98%)    PHYSICAL EXAM:  GENERAL: NAD, pleasant, sleepy  HEENT: PERRL, hearing normal, conjunctiva and sclera clear, nasal cannula in place  Chest: diffuse b/l inspiratory crackles appreciated, no wheezing  CV: +S1S2, RRR, systolic murmur appreciated most prominently in aortic area  GI: soft, +BS, NT/ND  Musculoskeletal: 2+ pitting edema in b/l LE, nontender, nonerythematous, L leg larger in size than R, L foot wrapped    LABS:                        7.2    9.96  )-----------( 362      ( 20 Sep 2018 07:59 )             23.4     20 Sep 2018 07:59    138    |  101    |  63     ----------------------------<  83     4.0     |  28     |  2.50     Ca    8.2        20 Sep 2018 07:59    CARDIAC MARKERS ( 18 Sep 2018 13:40 )  .038 ng/mL / x     / 34 U/L / x     / 2.5 ng/mL    MEDICATIONS  (STANDING):  ALBUTerol    0.083% 2.5 milliGRAM(s) Nebulizer every 8 hours  ascorbic acid 500 milliGRAM(s) Oral daily  calcium carbonate    500 mG (Tums) Chewable 2 Tablet(s) Chew daily  carvedilol 25 milliGRAM(s) Oral every 12 hours  cholecalciferol 1000 Unit(s) Oral daily  enoxaparin Injectable 40 milliGRAM(s) SubCutaneous every 24 hours  ferrous    sulfate 325 milliGRAM(s) Oral daily  finasteride 5 milliGRAM(s) Oral daily  furosemide   Injectable 40 milliGRAM(s) IV Push two times a day  gabapentin 100 milliGRAM(s) Oral at bedtime  hydrALAZINE 25 milliGRAM(s) Oral every 8 hours  influenza   Vaccine 0.5 milliLiter(s) IntraMuscular once  levothyroxine 50 MICROGram(s) Oral daily  multivitamin 1 Tablet(s) Oral daily  pantoprazole    Tablet 40 milliGRAM(s) Oral before breakfast  tamsulosin 0.4 milliGRAM(s) Oral at bedtime    MEDICATIONS  (PRN):  docusate sodium 100 milliGRAM(s) Oral daily PRN Constipation  magnesium hydroxide Suspension 15 milliLiter(s) Oral at bedtime PRN Constipation    Allergies  penicillins (Hives)

## 2018-09-20 NOTE — CONSULT NOTE ADULT - ASSESSMENT
Acute on CKD stage 4  CHF Exacerbation  HTN  Anemia - CKD    -Rise in creatinine can be multifactorial in this case. CHF exacerbation leading to poor renal perfusion but more typical when having associated hypotension, IV Diuretics, and/or Urinary Retention on top of what was mentioned  -Agree with holding the IV Lasix for today  -Checking urine indices  -Checking Bladder scan/Renal Sono  -Will monitor lytes and adjust diet accordingly  -Consider adding Norvasc for BP control  -Check Iron panel; will start on Procrit 10,000u TIW.   -Add fluid restriction of 1.2L to the diet  -Daily chemistries  -Monitor urine output  -Cardio eval noted    Thank you    D/W RN, Dr. Do, and Son at bedside Acute on CKD stage 4  CHF Exacerbation  HTN  Anemia - CKD    -Rise in creatinine can be multifactorial in this case. CHF exacerbation leading to poor renal perfusion but more typical when having associated hypotension, IV Diuretics, and/or Urinary Retention on top of what was mentioned  -The patients respiratory status is stable; hard to distinguish pulm edema from the underlying interstitial lung disease. Check O2 Sats, if stable and patient able to lay flat without problems, the I would not jeramie the leg edema and just place the patient on Oral Lasix. Avoid IV Lasix for now due to the acute rise in the creatinine  -Checking urine indices  -Checking Bladder scan/Renal Sono  -Will monitor lytes and adjust diet accordingly  -Consider adding Norvasc for BP control  -Check Iron panel; will start on Procrit 10,000u TIW.   -Add fluid restriction of 1.2L to the diet  -Daily chemistries  -Monitor urine output  -Cardio eval noted    Thank you    D/W RN, Dr. Do, and Son at bedside

## 2018-09-20 NOTE — CONSULT NOTE ADULT - ASSESSMENT
89 yo M with PMHx of HTN, Neuropathy, Hypothyroid, anemia, Gout, s/p calcaneal implanted and recent removal of infected hardware at [place] presented to ED BIB EMS from [facility] for difficulty breathing, dyspnea on exertion and left LE swellings    Patient is not complaining of any cardiac symptoms at this time. No acute changes on EKG that demonstrate ischemia or arrythmia. Elevated troponins possible attributed to kidney dysfunction in setting of elevated Cr and volume overload. Unlikely ischemia with CK-MB and CK without an elevation or trend.       Recommendations:     - 1/11/2018 Last TTE from Dr. Aneesh Anton (pvt cards) [phone]  with EF 65%, mild enlarged RV, severely dilated LA, moderate AS, moderate AR, mild MR, mild TR, moderately elevated pulm pressures, no pericardial effusion. Report left in chart.  - echo on 9/18/2018 with mod AS, NL LVfxn, stage II diastolic dysfxn, severe PHTN  - Continue carvedilol 25 mg po bid  - CE trend not c/w ACS.   - Continue hydralazine 25mg q 8hrs  - Monitor and replete lytes, keep K>4, Mg>2.   - Hold Lasix due to rising creatinine  - Recommend Renal evaluation  - Strict I/Os, daily weights.  - Monitor H/H closely (9/19 Hgb 7.5<--9.3<--7.9)  - Other cardiovascular workup will depend on clinical course.  - All other workup per primary team.  - Will continue to follow. 89 yo M with PMHx of HTN, Neuropathy, Hypothyroid, anemia, Gout, s/p calcaneal implanted and recent removal of infected hardware at [place] presented to ED BIB EMS from [facility] for difficulty breathing, dyspnea on exertion and left LE swellings    Patient is not complaining of any cardiac symptoms at this time. No acute changes on EKG that demonstrate ischemia or arrythmia. Elevated troponins possible attributed to kidney dysfunction in setting of elevated Cr and volume overload. Unlikely ischemia with CK-MB and CK without an elevation or trend. Daughter requested last night for transfer to [place].       Recommendations:     - 1/11/2018 Last TTE from Dr. Aneesh Anton (pvt cards) [phone]  with EF 65%, mild enlarged RV, severely dilated LA, moderate AS, moderate AR, mild MR, mild TR, moderately elevated pulm pressures, no pericardial effusion. Report left in chart.  - echo on 9/18/2018 with mod AS, NL LVfxn, stage II diastolic dysfxn, severe PHTN  - Continue carvedilol 25 mg po bid  - CE trend not c/w ACS.   - Continue hydralazine 25mg q 8hrs  - Monitor and replete lytes, keep K>4, Mg>2.   - Hold Lasix due to rising creatinine  - Pending Renal evaluation  - Strict I/Os, daily weights.  - Monitor H/H closely (9/19 Hgb 7.5<--9.3<--7.9)  - Other cardiovascular workup will depend on clinical course.  - All other workup per primary team.  - Will continue to follow.

## 2018-09-20 NOTE — PROGRESS NOTE ADULT - REASON FOR ADMISSION
shortness of breath

## 2018-09-20 NOTE — PHYSICAL THERAPY INITIAL EVALUATION ADULT - ADDITIONAL COMMENTS
[facility] and transferred with assistance x2. Pt is NWIAIN MERRILL. Pt had infected hardware removed from L ankle

## 2018-09-20 NOTE — PHYSICAL THERAPY INITIAL EVALUATION ADULT - PERTINENT HX OF CURRENT PROBLEM, REHAB EVAL
89 y/o male adm 9/17 from rehab with SOB. Dopplers: negative DVT BLE. Pt admitted with CHF exacerbations.

## 2018-09-20 NOTE — PROGRESS NOTE ADULT - PROBLEM SELECTOR PROBLEM 1
Dyspnea
Dyspnea
Acute on chronic congestive heart failure, unspecified heart failure type

## 2018-09-20 NOTE — PROGRESS NOTE ADULT - PROBLEM SELECTOR PLAN 9
- Pt has hx of anemia, likely chronic.  - Continue to monitor H/H: 7.9/25.1--> 9.3/29.4--> 7.5/23.9--> 7.2/23.4  - Type and screen ordered if patient needs transfusion (transfuse Hb <7)

## 2018-09-21 PROBLEM — M10.9 GOUT, UNSPECIFIED: Chronic | Status: ACTIVE | Noted: 2018-09-18

## 2018-09-21 PROBLEM — G62.9 POLYNEUROPATHY, UNSPECIFIED: Chronic | Status: ACTIVE | Noted: 2018-09-18

## 2018-09-21 PROBLEM — D64.9 ANEMIA, UNSPECIFIED: Chronic | Status: ACTIVE | Noted: 2018-09-18

## 2018-09-21 PROBLEM — E03.9 HYPOTHYROIDISM, UNSPECIFIED: Chronic | Status: ACTIVE | Noted: 2018-09-18

## 2018-09-21 PROBLEM — N40.0 BENIGN PROSTATIC HYPERPLASIA WITHOUT LOWER URINARY TRACT SYMPTOMS: Chronic | Status: ACTIVE | Noted: 2018-09-18

## 2018-09-21 PROBLEM — I10 ESSENTIAL (PRIMARY) HYPERTENSION: Chronic | Status: ACTIVE | Noted: 2018-09-17

## 2018-10-04 ENCOUNTER — APPOINTMENT (OUTPATIENT)
Dept: CARDIOLOGY | Facility: CLINIC | Age: 83
End: 2018-10-04

## 2018-10-22 ENCOUNTER — APPOINTMENT (OUTPATIENT)
Dept: CARDIOLOGY | Facility: CLINIC | Age: 83
End: 2018-10-22
Payer: MEDICARE

## 2018-10-22 VITALS
HEIGHT: 73 IN | DIASTOLIC BLOOD PRESSURE: 62 MMHG | RESPIRATION RATE: 18 BRPM | WEIGHT: 200 LBS | HEART RATE: 78 BPM | SYSTOLIC BLOOD PRESSURE: 122 MMHG | BODY MASS INDEX: 26.51 KG/M2

## 2018-10-22 DIAGNOSIS — I50.30 HYPERTENSIVE HEART DISEASE WITH HEART FAILURE: ICD-10-CM

## 2018-10-22 DIAGNOSIS — I11.0 HYPERTENSIVE HEART DISEASE WITH HEART FAILURE: ICD-10-CM

## 2018-10-22 PROCEDURE — 99214 OFFICE O/P EST MOD 30 MIN: CPT

## 2018-10-22 PROCEDURE — 93000 ELECTROCARDIOGRAM COMPLETE: CPT

## 2018-10-22 RX ORDER — ALFUZOSIN HYDROCHLORIDE 10 MG/1
10 TABLET, EXTENDED RELEASE ORAL DAILY
Qty: 90 | Refills: 0 | Status: DISCONTINUED | COMMUNITY
End: 2018-10-22

## 2018-10-22 RX ORDER — GABAPENTIN 100 MG/1
100 CAPSULE ORAL
Refills: 0 | Status: DISCONTINUED | COMMUNITY
Start: 2018-04-05 | End: 2018-10-22

## 2018-10-22 RX ORDER — HYDRALAZINE HYDROCHLORIDE 25 MG/1
25 TABLET ORAL 3 TIMES DAILY
Qty: 270 | Refills: 3 | Status: DISCONTINUED | COMMUNITY
End: 2018-10-22

## 2018-10-22 RX ORDER — LEVOTHYROXINE SODIUM 0.05 MG/1
50 TABLET ORAL DAILY
Qty: 90 | Refills: 3 | Status: ACTIVE | COMMUNITY

## 2018-10-24 PROCEDURE — 36415 COLL VENOUS BLD VENIPUNCTURE: CPT

## 2018-10-24 PROCEDURE — 84484 ASSAY OF TROPONIN QUANT: CPT

## 2018-10-24 PROCEDURE — 81001 URINALYSIS AUTO W/SCOPE: CPT

## 2018-10-24 PROCEDURE — 99285 EMERGENCY DEPT VISIT HI MDM: CPT | Mod: 25

## 2018-10-24 PROCEDURE — 85610 PROTHROMBIN TIME: CPT

## 2018-10-24 PROCEDURE — 94760 N-INVAS EAR/PLS OXIMETRY 1: CPT

## 2018-10-24 PROCEDURE — 94640 AIRWAY INHALATION TREATMENT: CPT

## 2018-10-24 PROCEDURE — 80053 COMPREHEN METABOLIC PANEL: CPT

## 2018-10-24 PROCEDURE — 84156 ASSAY OF PROTEIN URINE: CPT

## 2018-10-24 PROCEDURE — 82436 ASSAY OF URINE CHLORIDE: CPT

## 2018-10-24 PROCEDURE — 93005 ELECTROCARDIOGRAM TRACING: CPT

## 2018-10-24 PROCEDURE — 96374 THER/PROPH/DIAG INJ IV PUSH: CPT

## 2018-10-24 PROCEDURE — 82550 ASSAY OF CK (CPK): CPT

## 2018-10-24 PROCEDURE — 71250 CT THORAX DX C-: CPT

## 2018-10-24 PROCEDURE — 80048 BASIC METABOLIC PNL TOTAL CA: CPT

## 2018-10-24 PROCEDURE — 93306 TTE W/DOPPLER COMPLETE: CPT

## 2018-10-24 PROCEDURE — 71045 X-RAY EXAM CHEST 1 VIEW: CPT

## 2018-10-24 PROCEDURE — 86140 C-REACTIVE PROTEIN: CPT

## 2018-10-24 PROCEDURE — 97161 PT EVAL LOW COMPLEX 20 MIN: CPT

## 2018-10-24 PROCEDURE — 83880 ASSAY OF NATRIURETIC PEPTIDE: CPT

## 2018-10-24 PROCEDURE — 84540 ASSAY OF URINE/UREA-N: CPT

## 2018-10-24 PROCEDURE — 84300 ASSAY OF URINE SODIUM: CPT

## 2018-10-24 PROCEDURE — 84145 PROCALCITONIN (PCT): CPT

## 2018-10-24 PROCEDURE — 82553 CREATINE MB FRACTION: CPT

## 2018-10-24 PROCEDURE — 85027 COMPLETE CBC AUTOMATED: CPT

## 2018-10-24 PROCEDURE — 93970 EXTREMITY STUDY: CPT

## 2018-10-24 PROCEDURE — 86900 BLOOD TYPING SEROLOGIC ABO: CPT

## 2018-10-24 PROCEDURE — 86850 RBC ANTIBODY SCREEN: CPT

## 2018-10-24 PROCEDURE — 85730 THROMBOPLASTIN TIME PARTIAL: CPT

## 2018-10-24 PROCEDURE — 86901 BLOOD TYPING SEROLOGIC RH(D): CPT

## 2019-03-22 ENCOUNTER — RX RENEWAL (OUTPATIENT)
Age: 84
End: 2019-03-22

## 2019-03-25 RX ORDER — CARVEDILOL 25 MG/1
25 TABLET, FILM COATED ORAL TWICE DAILY
Qty: 180 | Refills: 3 | Status: ACTIVE | COMMUNITY
Start: 1900-01-01 | End: 1900-01-01

## 2019-04-11 ENCOUNTER — CLINICAL ADVICE (OUTPATIENT)
Age: 84
End: 2019-04-11

## 2019-04-12 ENCOUNTER — CLINICAL ADVICE (OUTPATIENT)
Age: 84
End: 2019-04-12

## 2019-04-12 ENCOUNTER — RX CHANGE (OUTPATIENT)
Age: 84
End: 2019-04-12

## 2019-04-12 RX ORDER — TORSEMIDE 10 MG/1
10 TABLET ORAL DAILY
Qty: 180 | Refills: 1 | Status: ACTIVE | COMMUNITY
Start: 2019-04-12 | End: 1900-01-01

## 2019-04-12 RX ORDER — TORSEMIDE 20 MG/1
20 TABLET ORAL DAILY
Qty: 90 | Refills: 1 | Status: DISCONTINUED | COMMUNITY
Start: 2018-10-22 | End: 2019-04-12

## 2019-09-11 ENCOUNTER — APPOINTMENT (OUTPATIENT)
Dept: CARDIOLOGY | Facility: CLINIC | Age: 84
End: 2019-09-11
Payer: MEDICARE

## 2019-09-11 ENCOUNTER — NON-APPOINTMENT (OUTPATIENT)
Age: 84
End: 2019-09-11

## 2019-09-11 VITALS
SYSTOLIC BLOOD PRESSURE: 145 MMHG | HEART RATE: 63 BPM | BODY MASS INDEX: 29.82 KG/M2 | OXYGEN SATURATION: 98 % | RESPIRATION RATE: 16 BRPM | HEIGHT: 73 IN | DIASTOLIC BLOOD PRESSURE: 85 MMHG | WEIGHT: 225 LBS

## 2019-09-11 DIAGNOSIS — N40.0 BENIGN PROSTATIC HYPERPLASIA WITHOUT LOWER URINARY TRACT SYMPMS: ICD-10-CM

## 2019-09-11 DIAGNOSIS — Z01.818 ENCOUNTER FOR OTHER PREPROCEDURAL EXAMINATION: ICD-10-CM

## 2019-09-11 PROCEDURE — 99214 OFFICE O/P EST MOD 30 MIN: CPT

## 2019-09-11 PROCEDURE — 93000 ELECTROCARDIOGRAM COMPLETE: CPT

## 2019-09-11 RX ORDER — VITAMIN A 3000 MCG
1000 CAPSULE ORAL DAILY
Refills: 0 | Status: DISCONTINUED | COMMUNITY
Start: 2017-08-26 | End: 2019-09-11

## 2019-09-11 RX ORDER — DIPHENHYDRAMINE HCL 12.5 MG/5ML
25 MCG LIQUID ORAL DAILY
Refills: 0 | Status: DISCONTINUED | COMMUNITY
Start: 2017-11-08 | End: 2019-09-11

## 2019-09-11 RX ORDER — CALCIUM CARBONATE 500(1250)
500 TABLET ORAL
Refills: 0 | Status: ACTIVE | COMMUNITY

## 2019-09-11 RX ORDER — ACETAMINOPHEN 160 MG/5ML
500 SUSPENSION ORAL
Refills: 0 | Status: ACTIVE | COMMUNITY

## 2019-09-11 RX ORDER — ALLOPURINOL 100 MG/1
100 TABLET ORAL TWICE DAILY
Refills: 0 | Status: ACTIVE | COMMUNITY

## 2019-09-11 RX ORDER — ALFUZOSIN HYDROCHLORIDE 10 MG/1
10 TABLET, EXTENDED RELEASE ORAL
Refills: 0 | Status: ACTIVE | COMMUNITY

## 2019-09-11 RX ORDER — DOXYCYCLINE HYCLATE 100 MG/1
100 CAPSULE ORAL
Refills: 0 | Status: DISCONTINUED | COMMUNITY
End: 2019-09-11

## 2019-09-11 NOTE — REASON FOR VISIT
[FreeTextEntry1] : This is a 89-year-old male presenting here for cardiac evaluation \par    The patient’s history includes that of: \par 1.  Chronic atrial fibrillation. \par  2.  Aortic valve stenosis \par  3.  Diastolic heart failure. \par  4.  Stage III chronic kidney disease.  \par  5.  Diabetes mellitus. \par  6.  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 \par \par  He denies an post operative complication and wearing a prosthesis. now ambulates with a walker. \par \par No active anginal symptoms,  palpitations or SOB.

## 2019-09-11 NOTE — HISTORY OF PRESENT ILLNESS
[FreeTextEntry1] :   The patient did have a hospitalization for congestive heart failure recently.  The cause of this was no entirely clear.   His most recent echocardiogram showed a normal ejection fraction with mild to moderate aortic valve stenosis.    .   \par

## 2019-09-11 NOTE — PHYSICAL EXAM
[Normal Conjunctiva] : the conjunctiva exhibited no abnormalities [Eyelids - No Xanthelasma] : the eyelids demonstrated no xanthelasmas [Normal Oral Mucosa] : normal oral mucosa [No Oral Pallor] : no oral pallor [No Oral Cyanosis] : no oral cyanosis [Normal Jugular Venous A Waves Present] : normal jugular venous A waves present [Normal Jugular Venous V Waves Present] : normal jugular venous V waves present [No Jugular Venous [place] A Waves] : no jugular venous holland A waves [Abdomen Soft] : soft [Abdomen Tenderness] : non-tender [Abdomen Mass (___ Cm)] : no abdominal mass palpated [Skin Color & Pigmentation] : normal skin color and pigmentation [] : no rash [No Venous Stasis] : no venous stasis [Skin Lesions] : no skin lesions [No Skin Ulcers] : no skin ulcer [No Xanthoma] : no  xanthoma was observed [FreeTextEntry1] : S/P L BKA with prosthesis now in place.

## 2019-09-11 NOTE — ASSESSMENT
[FreeTextEntry1] : ECG:  Normal sinus rhythm at 63.  Right bundle branch block pattern.  Leftward axis deviation left atrial enlargement, possible old anterior infarct.\par \par Lab data 7/30/19\par Chol. 207\par HDL   31\par LDL  144\par Tri     180\par HGB 11.3\par A1C 5.5\par \par Creat 3.10 \par \par Impression:\par 89-year-old male s/p  left below-knee amputation because of recurrent infection and a chronically debilitated left foot that can never be used for walking purposes.  No post operative complications \par \par Had a bout of congestive heart failure, presumptively diastolic because of dietary indiscretions and renal insufficiency.  There is no recurrence , signs of HF, or  edema present during exam \par \par -Last echo in January of 2018 revealed an EF of 65%, biatrial enlargement and mod. pulm htn with  a PA pressure of 48 mmHg.\par \par -Mild to moderate aortic stenosis which is baseline.  \par \par -History of p. atrial fibrillation but maintaining sinus rhythm with bifascicular block. \par \par -No active anginal symptoms.  \par \par -Creat. 3.10 consistent with CKD stage 3B, Lipids elevated but stable at this time. \par \par -Blood pressure today 145/85 and stable. BMI now 29. \par \par Plan\par 1. Repeat echo to reassess pulmonary hypertension and AS\par 2. Continue all medications as prescribed\par 2. Continue to F/U with PCP as scheduled and have all blood work faxed to our office. \par 4. Monitor Sodium intake carefully. call with any increased SOB\par \par Clinical follow up in 3-4 months.  \par

## 2020-01-06 ENCOUNTER — RECORD ABSTRACTING (OUTPATIENT)
Age: 85
End: 2020-01-06

## 2020-01-06 RX ORDER — ACETAMINOPHEN 325 MG
325 SUPPOSITORY, RECTAL RECTAL
Refills: 0 | Status: ACTIVE | COMMUNITY

## 2020-01-07 ENCOUNTER — APPOINTMENT (OUTPATIENT)
Dept: CARDIOLOGY | Facility: CLINIC | Age: 85
End: 2020-01-07

## 2020-01-14 ENCOUNTER — RESULT CHARGE (OUTPATIENT)
Age: 85
End: 2020-01-14

## 2020-01-15 ENCOUNTER — APPOINTMENT (OUTPATIENT)
Dept: CARDIOLOGY | Facility: CLINIC | Age: 85
End: 2020-01-15
Payer: MEDICARE

## 2020-01-15 ENCOUNTER — NON-APPOINTMENT (OUTPATIENT)
Age: 85
End: 2020-01-15

## 2020-01-15 VITALS
HEIGHT: 73 IN | OXYGEN SATURATION: 97 % | HEART RATE: 64 BPM | WEIGHT: 218 LBS | SYSTOLIC BLOOD PRESSURE: 142 MMHG | RESPIRATION RATE: 16 BRPM | DIASTOLIC BLOOD PRESSURE: 85 MMHG | BODY MASS INDEX: 28.89 KG/M2

## 2020-01-15 DIAGNOSIS — I25.9 CHRONIC ISCHEMIC HEART DISEASE, UNSPECIFIED: ICD-10-CM

## 2020-01-15 DIAGNOSIS — I10 ESSENTIAL (PRIMARY) HYPERTENSION: ICD-10-CM

## 2020-01-15 DIAGNOSIS — Z00.00 ENCOUNTER FOR GENERAL ADULT MEDICAL EXAMINATION W/OUT ABNORMAL FINDINGS: ICD-10-CM

## 2020-01-15 DIAGNOSIS — I71.9 AORTIC ANEURYSM OF UNSPECIFIED SITE, W/OUT RUPTURE: ICD-10-CM

## 2020-01-15 DIAGNOSIS — I35.0 NONRHEUMATIC AORTIC (VALVE) STENOSIS: ICD-10-CM

## 2020-01-15 DIAGNOSIS — I27.20 PULMONARY HYPERTENSION, UNSPECIFIED: ICD-10-CM

## 2020-01-15 DIAGNOSIS — N18.3 CHRONIC KIDNEY DISEASE, STAGE 3 (MODERATE): ICD-10-CM

## 2020-01-15 DIAGNOSIS — I51.7 CARDIOMEGALY: ICD-10-CM

## 2020-01-15 DIAGNOSIS — R60.0 LOCALIZED EDEMA: ICD-10-CM

## 2020-01-15 DIAGNOSIS — I77.810 THORACIC AORTIC ECTASIA: ICD-10-CM

## 2020-01-15 DIAGNOSIS — R09.89 OTHER SPECIFIED SYMPTOMS AND SIGNS INVOLVING THE CIRCULATORY AND RESPIRATORY SYSTEMS: ICD-10-CM

## 2020-01-15 DIAGNOSIS — Z89.519 ACQUIRED ABSENCE OF UNSPECIFIED LEG BELOW KNEE: ICD-10-CM

## 2020-01-15 PROCEDURE — 93000 ELECTROCARDIOGRAM COMPLETE: CPT

## 2020-01-15 PROCEDURE — 99215 OFFICE O/P EST HI 40 MIN: CPT

## 2020-01-15 PROCEDURE — 93306 TTE W/DOPPLER COMPLETE: CPT

## 2020-01-15 NOTE — PHYSICAL EXAM
[Normal Conjunctiva] : the conjunctiva exhibited no abnormalities [Eyelids - No Xanthelasma] : the eyelids demonstrated no xanthelasmas [Normal Oral Mucosa] : normal oral mucosa [No Oral Pallor] : no oral pallor [No Oral Cyanosis] : no oral cyanosis [Normal Jugular Venous A Waves Present] : normal jugular venous A waves present [Normal Jugular Venous V Waves Present] : normal jugular venous V waves present [No Jugular Venous [place] A Waves] : no jugular venous holland A waves [Abdomen Tenderness] : non-tender [Abdomen Soft] : soft [Abdomen Mass (___ Cm)] : no abdominal mass palpated [] : no rash [Skin Color & Pigmentation] : normal skin color and pigmentation [No Skin Ulcers] : no skin ulcer [No Venous Stasis] : no venous stasis [Skin Lesions] : no skin lesions [No Xanthoma] : no  xanthoma was observed [FreeTextEntry1] : S/P L BKA with prosthesis now in place.

## 2020-01-15 NOTE — ASSESSMENT
[FreeTextEntry1] : ECG:  Normal sinus rhythm at 64.  Right bundle branch block pattern.  Leftward axis deviation left atrial enlargement, possible old anterior infarct.\par \par Abdominal ultrasound 10/18/19 for an outside facility\par Distal abdominal aortic aneurysm 4.9 x 3.8 cm.\par \par Echocardiogram 1/15/20:\par Overall normal left ventricular systolic function with basal inferior wall hypokinesis.\par Moderate aortic valve stenosis peak gradient of 27 mean gradient 13\par Mild to moderate mitral valve insufficiency\par Moderate pulmonary hypertension 57 mm of mercury.\par \par Lab data 7/30/19\par Chol. 207\par HDL   31\par LDL  144\par Tri     180\par HGB 11.3\par A1C 5.5\par \par Creat 3.10 \par \par Impression:\par 89-year-old male s/p  left below-knee amputation because of recurrent infection and a chronically debilitated left foot that can never be used for walking purposes.  No post operative complications \par \par Had a bout of congestive heart failure, presumptively diastolic because of dietary indiscretions and renal insufficiency.  There is no recurrence , signs of HF, or  edema present during exam \par \par -current echo revealed an EF of 65%, biatrial enlargement and mod. pulm htn with  a PA pressure of 57 mmHg. increased from 47 mm hg\par \par -Mild to moderate aortic stenosis which is baseline.  \par \par -History of p. atrial fibrillation but maintaining sinus rhythm with bifascicular block. \par \par -No active anginal symptoms.  \par \par -Creat. 3.10 consistent with CKD stage 3B, Lipids elevated but stable at this time. \par \par -Blood pressure today 145/85 and stable. BMI now 29. \par \par - Large AAA which is concerning. Given pt's age and co-morbidities including advanced CKD, will follow conservatively\par \par - New loud left carotid Bruit\par \par Plan\par 1. Repeat Abd US to reassess the AAA in April\par 2. Carotid duplex to assess the bruit L side\par 3. Report any increased SOB or edema as might be assoc with his PAH\par 2. Continue to F/U with PCP as scheduled and have all blood work faxed to our office. \par 4. Monitor Sodium intake carefully. call with any increased SOB\par \par Clinical follow up in 3-4 months.  \par

## 2020-01-15 NOTE — REASON FOR VISIT
[FreeTextEntry1] : This is a 89-year-old male presenting here for cardiac evaluation \par    The patient’s history includes that of: \par 1.  Chronic atrial fibrillation. \par  2.  Aortic valve stenosis \par  3.  Diastolic heart failure. \par  4.  Stage III chronic kidney disease.  \par  5.  Diabetes mellitus. \par  6.  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 \par  7. AAA\par \par   He denies an post operative complication and wearing a prosthesis. now ambulates with a walker. \par \par No active anginal symptoms,  palpitations or SOB.

## 2020-02-17 ENCOUNTER — APPOINTMENT (OUTPATIENT)
Dept: CARDIOLOGY | Facility: CLINIC | Age: 85
End: 2020-02-17
Payer: MEDICARE

## 2020-02-17 PROCEDURE — 93978 VASCULAR STUDY: CPT

## 2020-02-17 PROCEDURE — 93880 EXTRACRANIAL BILAT STUDY: CPT

## 2020-02-28 NOTE — DISCHARGE NOTE ADULT - NS AS DC FU [facility] EJECTION FRACTION >40 %
Spoke with pt, he is ok with appointment change. I provided pt with address of clinic. All pt questions answered.    yes

## 2020-05-18 ENCOUNTER — APPOINTMENT (OUTPATIENT)
Dept: CARDIOLOGY | Facility: CLINIC | Age: 85
End: 2020-05-18

## 2022-01-22 NOTE — ED PROVIDER NOTE - ATTESTATION, MLM
PAST SURGICAL HISTORY:  H/O hernia repair      I have reviewed and confirmed nurses' notes for patient's medications, allergies, medical history, and surgical history.

## 2023-07-11 NOTE — ED PROVIDER NOTE - CAS EDP CONSULT WILL SEE PT

## 2023-07-25 NOTE — H&P ADULT - PROBLEM SELECTOR PLAN 10
no continue Lovenox 40mg subq daily   IMPROVE VTE Individual Risk Assessment          RISK                                                          Points  [  ] Previous VTE                                                3  [  ] Thrombophilia                                             2  [  ] Lower limb paralysis                                   2        (unable to hold up >15 seconds)    [  ] Current Cancer                                             2         (within 6 months)  [ x ] Immobilization > 24 hrs                              1  [  ] ICU/CCU stay > 24 hours                             1  [ x ] Age > 60                                                         1    IMPROVE VTE Score: 2

## 2024-02-09 NOTE — PROGRESS NOTE ADULT - PROBLEM SELECTOR PROBLEM 10
[Time Spent: ___ minutes] : I have spent [unfilled] minutes of time on the encounter.
Need for prophylactic measure